# Patient Record
Sex: MALE | Race: BLACK OR AFRICAN AMERICAN | Employment: FULL TIME | URBAN - METROPOLITAN AREA
[De-identification: names, ages, dates, MRNs, and addresses within clinical notes are randomized per-mention and may not be internally consistent; named-entity substitution may affect disease eponyms.]

---

## 2018-02-14 ENCOUNTER — HOSPITAL ENCOUNTER (OUTPATIENT)
Dept: SURGERY | Age: 58
Discharge: HOME | End: 2018-02-14

## 2018-02-15 VITALS — WEIGHT: 190 LBS | BODY MASS INDEX: 25.73 KG/M2 | HEIGHT: 72 IN

## 2018-02-15 NOTE — PERIOP NOTES
Patient verified name, , and procedure. Type: 1a; abbreviated assessment per anesthesia guidelines  Labs per surgeon: none  Labs per anesthesia: none    Instructed pt that they will be notified via office/GI LAB for time of arrival to GI lab. Follow diet and prep instructions per office. NPO after midnight. Bath or shower the night before and the am of surgery with antibacterial soap. No lotions, oils, powders, cologne on skin. No make up, eye make up or jewelry. Wear loose fitting comfortable, clean clothing. Must have adult present in building the entire time . Medications for the day of procedure- none; patient to hold- none. The following discharge instructions reviewed with patient: medication given during procedure may cause drowsiness for several hours, therefore, do not drive or operate machinery for remainder of the day. You may not drink alcohol on the day of your procedure, please resume regular diet and activity unless otherwise directed. You may experience abdominal distention for several hours that is relieved by the passage of gas. Contact your physician if you have any of the following: fever or chills, severe abdominal pain or excessive amount of bleeding or a large amount when having a bowel movement.  Occasional specks of blood with bowel movement would not be unusual.

## 2018-02-19 ENCOUNTER — ANESTHESIA EVENT (OUTPATIENT)
Dept: ENDOSCOPY | Age: 58
End: 2018-02-19
Payer: COMMERCIAL

## 2018-02-19 RX ORDER — SODIUM CHLORIDE, SODIUM LACTATE, POTASSIUM CHLORIDE, CALCIUM CHLORIDE 600; 310; 30; 20 MG/100ML; MG/100ML; MG/100ML; MG/100ML
100 INJECTION, SOLUTION INTRAVENOUS CONTINUOUS
Status: CANCELLED | OUTPATIENT
Start: 2018-02-19

## 2018-02-19 RX ORDER — SODIUM CHLORIDE 0.9 % (FLUSH) 0.9 %
5-10 SYRINGE (ML) INJECTION AS NEEDED
Status: CANCELLED | OUTPATIENT
Start: 2018-02-19

## 2018-02-20 ENCOUNTER — ANESTHESIA (OUTPATIENT)
Dept: ENDOSCOPY | Age: 58
End: 2018-02-20
Payer: COMMERCIAL

## 2018-02-20 ENCOUNTER — HOSPITAL ENCOUNTER (OUTPATIENT)
Age: 58
Setting detail: OUTPATIENT SURGERY
Discharge: HOME | End: 2018-02-20
Attending: SURGERY | Admitting: SURGERY
Payer: COMMERCIAL

## 2018-02-20 VITALS
RESPIRATION RATE: 14 BRPM | OXYGEN SATURATION: 100 % | DIASTOLIC BLOOD PRESSURE: 72 MMHG | SYSTOLIC BLOOD PRESSURE: 111 MMHG | TEMPERATURE: 97.6 F | WEIGHT: 185 LBS | BODY MASS INDEX: 25.09 KG/M2 | HEART RATE: 60 BPM

## 2018-02-20 PROBLEM — Z12.11 SCREENING FOR COLON CANCER: Status: ACTIVE | Noted: 2018-02-20

## 2018-02-20 LAB — GLUCOSE BLD STRIP.AUTO-MCNC: 109 MG/DL (ref 65–100)

## 2018-02-20 PROCEDURE — 74011250636 HC RX REV CODE- 250/636

## 2018-02-20 PROCEDURE — 76040000025: Performed by: SURGERY

## 2018-02-20 PROCEDURE — 76060000032 HC ANESTHESIA 0.5 TO 1 HR: Performed by: SURGERY

## 2018-02-20 PROCEDURE — 82962 GLUCOSE BLOOD TEST: CPT

## 2018-02-20 PROCEDURE — 74011250636 HC RX REV CODE- 250/636: Performed by: ANESTHESIOLOGY

## 2018-02-20 RX ORDER — SODIUM CHLORIDE, SODIUM LACTATE, POTASSIUM CHLORIDE, CALCIUM CHLORIDE 600; 310; 30; 20 MG/100ML; MG/100ML; MG/100ML; MG/100ML
100 INJECTION, SOLUTION INTRAVENOUS CONTINUOUS
Status: DISCONTINUED | OUTPATIENT
Start: 2018-02-20 | End: 2018-02-20 | Stop reason: HOSPADM

## 2018-02-20 RX ORDER — PROPOFOL 10 MG/ML
INJECTION, EMULSION INTRAVENOUS AS NEEDED
Status: DISCONTINUED | OUTPATIENT
Start: 2018-02-20 | End: 2018-02-20 | Stop reason: HOSPADM

## 2018-02-20 RX ORDER — PROPOFOL 10 MG/ML
INJECTION, EMULSION INTRAVENOUS
Status: DISCONTINUED | OUTPATIENT
Start: 2018-02-20 | End: 2018-02-20 | Stop reason: HOSPADM

## 2018-02-20 RX ADMIN — PROPOFOL 120 MG: 10 INJECTION, EMULSION INTRAVENOUS at 09:53

## 2018-02-20 RX ADMIN — SODIUM CHLORIDE, SODIUM LACTATE, POTASSIUM CHLORIDE, AND CALCIUM CHLORIDE 100 ML/HR: 600; 310; 30; 20 INJECTION, SOLUTION INTRAVENOUS at 09:39

## 2018-02-20 RX ADMIN — PROPOFOL 200 MCG/KG/MIN: 10 INJECTION, EMULSION INTRAVENOUS at 09:53

## 2018-02-20 RX ADMIN — SODIUM CHLORIDE, SODIUM LACTATE, POTASSIUM CHLORIDE, AND CALCIUM CHLORIDE: 600; 310; 30; 20 INJECTION, SOLUTION INTRAVENOUS at 09:47

## 2018-02-20 NOTE — H&P
William Duran    2/20/2018    Date of Admission:  2/20/2018      Subjective:     Patient is a 59 y.o.  male presents for screening colonoscopy. The patient reports no problems. The patient denies family history of colon cancer. The patient has never had a colonoscopy in the past. Otherwise there is no reported rectal bleeding or melena. No changes in appetite or unusual wt loss. No abdominal pain or bloating. No reported changes in bowel habits. Patient Active Problem List    Diagnosis Date Noted    Screening for colon cancer 02/20/2018     Past Medical History:   Diagnosis Date    Blurry vision, bilateral     pt denies currently (2/15/18)    Prediabetes     pt states he is not taking metformin at this time- he is currently \"watching my diet and exercising\"      Past Surgical History:   Procedure Laterality Date    HX ORTHOPAEDIC      left knee sx 20 yrs ago    HX ORTHOPAEDIC Right 2016    finger sx      Prior to Admission Medications   Prescriptions Last Dose Informant Patient Reported? Taking? Blood-Glucose Meter monitoring kit   No No   Sig: Check blood glucose daily in am   Lancets misc   No No   Sig: Check blood glucose daily in am   glucose blood VI test strips (ASCENSIA AUTODISC VI, ONE TOUCH ULTRA TEST VI) strip   No No   Sig: Check blood sugar daily   metFORMIN (GLUCOPHAGE) 500 mg tablet   No No   Sig: Take 1 Tab by mouth two (2) times daily (with meals).    Patient not taking: Reported on 2/15/2018      Facility-Administered Medications: None     No Known Allergies   Social History   Substance Use Topics    Smoking status: Never Smoker    Smokeless tobacco: Never Used    Alcohol use No      Social History     Social History Narrative     Family History   Problem Relation Age of Onset    Diabetes Mother     Hypertension Mother     Cancer Father      prostate    Hypertension Father     Diabetes Sister     Hypertension Sister     Thyroid Disease Brother two brothers has thyroid,     Hypertension Brother     Cancer Brother     Diabetes Maternal Grandmother     No Known Problems Paternal Grandmother     No Known Problems Paternal Grandfather     Diabetes Maternal Aunt     Diabetes Maternal Uncle         Current Facility-Administered Medications   Medication Dose Route Frequency    lactated Ringers infusion  100 mL/hr IntraVENous CONTINUOUS       Review of Systems  A comprehensive review of systems was negative except for that written in the HPI. Objective:     Vitals:    02/20/18 0932   BP: (!) 143/93   Pulse: (!) 59   SpO2: 98%   Weight: 185 lb (83.9 kg)     PHYSICAL EXAM   Physical Examination: General appearance - alert, well appearing, and in no distress  Mental status - alert, oriented to person, place, and time  Eyes - pupils equal and reactive, extraocular eye movements intact  Nose - normal and patent, no erythema, discharge or polyps  Neck - supple, no significant adenopathy  Chest - clear to auscultation, no wheezes, rales or rhonchi, symmetric air entry  Heart - normal rate, regular rhythm, normal S1, S2, no murmurs, rubs, clicks or gallops  Abdomen - soft, nontender, nondistended, no masses or organomegaly  Neurological - alert, oriented, normal speech, no focal findings or movement disorder noted  Musculoskeletal - no joint tenderness, deformity or swelling  Extremities - peripheral pulses normal, no pedal edema, no clubbing or cyanosis  Skin - normal coloration and turgor, no rashes, no suspicious skin lesions noted      No results for input(s): WBC, HGB, HCT, PLT, HGBEXT, HCTEXT, PLTEXT in the last 72 hours. No results for input(s): NA, K, CL, GLU, CO2, BUN, CREA, MG, PHOS, TROIQ, INR, BNPP in the last 72 hours. No lab exists for component: TROIP, INREXT  No results for input(s): PH, PCO2, PO2, HCO3 in the last 72 hours.     Assessment:     Hospital Problems  Date Reviewed: 2/20/2018          Codes Class Noted POA    * (Principal)Screening for colon cancer ICD-10-CM: Z12.11  ICD-9-CM: V76.51  2/20/2018 Unknown            Plan:   Screening colonoscopy      Vicente Colindres, DO

## 2018-02-20 NOTE — PROCEDURES
Procedure in Detail:  Informed consent was obtained for the procedure. The patient was placed in the left lateral decubitus position and sedation was induced by anesthesia. The [id number] was inserted into the rectum and advanced under direct vision to the cecum, which was identified by the ileocecal valve and appendiceal orifice. The quality of the colonic preparation was excellent. A careful inspection was made as the colonoscope was withdrawn, including a retroflexed view of the rectum; findings and interventions are described below. Appropriate photodocumentation was obtained. Findings:   ANUS: Anal exam reveals no masses or hemorrhoids, sphincter tone is normal.   RECTUM: Rectal exam reveals no masses or hemorrhoids. SIGMOID COLON: The mucosa is normal with good vascular pattern and without ulcers, diverticula, and polyps. DESCENDING COLON: The mucosa is normal with good vascular pattern and without ulcers, diverticula, and polyps. SPLENIC FLEXURE: The splenic flexure is normal.   TRANSVERSE COLON: The mucosa is normal with good vascular pattern and without ulcers, diverticula, and polyps. HEPATIC FLEXURE: The hepatic flexure is normal.   ASCENDING COLON: The mucosa is normal with good vascular pattern and without ulcers, diverticula, and polyps. CECUM: The appendiceal orifice appears normal. The ileocecal valve appears normal.   TERMINAL ILEUM: The terminal ileum was not entered. Specimens: No specimens were collected. Complications: None; patient tolerated the procedure well. \    EBL - none    Recommendations:   - For colon cancer screening in this average-risk patient, colonoscopy may be repeated in 10 years.      Signed By: Huma Zeng DO                        February 20, 2018

## 2018-02-20 NOTE — ANESTHESIA POSTPROCEDURE EVALUATION
Post-Anesthesia Evaluation and Assessment    Patient: Marcel Mast MRN: [id number]  SSN: [id number]    YOB: 1960  Age: 59 y.o. Sex: male       Cardiovascular Function/Vital Signs  Visit Vitals    /72 (BP 1 Location: Right arm, BP Patient Position: At rest)    Pulse 60    Temp 36.4 °C (97.6 °F)    Resp 14    Wt 83.9 kg (185 lb)    SpO2 100%    BMI 25.09 kg/m2       Patient is status post total IV anesthesia anesthesia for Procedure(s):  COLONOSCOPY/ 27.    Nausea/Vomiting: None    Postoperative hydration reviewed and adequate. Pain:  Pain Scale 1: Visual (02/20/18 1037)  Pain Intensity 1: 0 (02/20/18 1037)   Managed    Neurological Status: At baseline    Mental Status and Level of Consciousness: Arousable    Pulmonary Status:   O2 Device: Room air (02/20/18 1037)   Adequate oxygenation and airway patent    Complications related to anesthesia: None    Post-anesthesia assessment completed.  No concerns    Signed By: Manisha Stevens MD     February 20, 2018

## 2018-02-20 NOTE — DISCHARGE INSTRUCTIONS
Gastrointestinal Colonoscopy/Flexible Sigmoidoscopy - Lower Exam Discharge Instructions  1. Call Selma Bernabe at office for any problems or questions. 2. Contact the doctors office for follow up appointment as directed  3. Medication may cause drowsiness for several hours, therefore, do not drive or operate machinery for remainder of the day. 4. No alcohol today. 5. Ordinarily, you may resume regular diet and activity after exam unless otherwise specified by your physician. 6. Because of air put into your colon during exam, you may experience some abdominal distension, relieved by the passage of gas, for several hours. 7. Contact your physician if you have any of the following:  a. Excessive amount of bleeding - large amount when having a bowel movement. Occasional specks of blood with bowel movement would not be unusual.  b. Severe abdominal pain  c. Fever or Chills  8. Polyp Removal - follow these additional instructions  a. Clear liquid diet for the next meal (jell-o, broth, clear drinks)  b. Soft diet for 24 hours, then resume regular diet   c. Take Metamucil - 1 tablespoon in juice every morning for 3 days  d. No Aspirin, Advil, Aleve, Nuprin, Ibuprofen, or medications that contain these drugs for 2 weeks. Any additional instructions:  ***      Instructions given to Nikko Tod and other family members.   Instructions given by:  Braulio Mantilla RN

## 2018-02-20 NOTE — IP AVS SNAPSHOT
[address] [facility], [place] 
 
 
 [address] 
[phone] Patient: Amor Escalante MRN: [id number] ILZ:[id number] About your hospitalization You were admitted on:  February 20, 2018 You last received care in the:  [facility] PREOP You were discharged on:  February 20, 2018 Why you were hospitalized Your primary diagnosis was:  Screening For Colon Cancer Follow-up Information Follow up With Details Comments Contact Info DO Kyle Abarcaervmulugeta  [address] [facility] [address] 
[phone] Your Scheduled Appointments Tuesday February 20, 2018 COLONOSCOPY with DO ORA Abarca ENDOSCOPY ([address]) [address]  
[phone] Discharge Orders None A check reynaldo indicates which time of day the medication should be taken. My Medications ASK your doctor about these medications Instructions Each Dose to Equal  
 Morning Noon Evening Bedtime Blood-Glucose Meter monitoring kit Your last dose was: Your next dose is:    
   
   
 Check blood glucose daily in am  
     
   
   
   
  
 glucose blood VI test strips strip Commonly known as:  ASCENSIA AUTODISC VI, ONE TOUCH ULTRA TEST VI Your last dose was: Your next dose is:    
   
   
 Check blood sugar daily Lancets Misc Your last dose was: Your next dose is:    
   
   
 Check blood glucose daily in am  
     
   
   
   
  
 metFORMIN 500 mg tablet Commonly known as:  GLUCOPHAGE Your last dose was: Your next dose is: Take 1 Tab by mouth two (2) times daily (with meals). 500 mg Discharge Instructions Gastrointestinal Colonoscopy/Flexible Sigmoidoscopy - Lower Exam Discharge Instructions 1. Call Sina Dennis at office for any problems or questions. 2. Contact the doctors office for follow up appointment as directed 3. Medication may cause drowsiness for several hours, therefore, do not drive or operate machinery for remainder of the day. 4. No alcohol today. 5. Ordinarily, you may resume regular diet and activity after exam unless otherwise specified by your physician. 6. Because of air put into your colon during exam, you may experience some abdominal distension, relieved by the passage of gas, for several hours. 7. Contact your physician if you have any of the following: 
a. Excessive amount of bleeding  large amount when having a bowel movement. Occasional specks of blood with bowel movement would not be unusual. 
b. Severe abdominal pain 
c. Fever or Chills 8. Polyp Removal  follow these additional instructions 
a. Clear liquid diet for the next meal (jell-o, broth, clear drinks) b. Soft diet for 24 hours, then resume regular diet  
c. Take Metamucil  1 tablespoon in juice every morning for 3 days 
d. No Aspirin, Advil, Aleve, Nuprin, Ibuprofen, or medications that contain these drugs for 2 weeks. Any additional instructions:  *** Instructions given to Amor Escalante and other family members. Instructions given by:  Oscar Padron RN Introducing [facility]! [facility] introduces [email or web address] patient portal. Now you can access parts of your medical record, email your doctor's office, and request medication refills online. 1. In your internet browser, go to [email or web address] 2. Click on the First Time User? Click Here link in the Sign In box. You will see the New Member Sign Up page. 3. Enter your [email or web address] Access Code exactly as it appears below. You will not need to use this code after youve completed the sign-up process. If you do not sign up before the expiration date, you must request a new code. · Weblio Access Code: [email or web address] Expires: 5/14/2018  5:34 AM 
 
4. Enter the last four digits of your Social Security Number (xxxx) and Date of Birth (mm/dd/yyyy) as indicated and click Submit. You will be taken to the next sign-up page. 5. Create a Weblio ID. This will be your Weblio login ID and cannot be changed, so think of one that is secure and easy to remember. 6. Create a Weblio password. You can change your password at any time. 7. Enter your Password Reset Question and Answer. This can be used at a later time if you forget your password. 8. Enter your e-mail address. You will receive e-mail notification when new information is available in [address] 9. Click Sign Up. You can now view and download portions of your medical record. 10. Click the Download Summary menu link to download a portable copy of your medical information. If you have questions, please visit the Frequently Asked Questions section of the Weblio website. Remember, Weblio is NOT to be used for urgent needs. For medical emergencies, dial 911. Now available from your iPhone and Android! Providers Seen During Your Hospitalization Provider Specialty Primary office phone Huma Zeng, Jennifer [facility] [phone] Your Primary Care Physician (PCP) Primary Care Physician Office Phone Office Fax Laneta Covert [address] You are allergic to the following No active allergies Recent Documentation Weight BMI Smoking Status 83.9 kg 25.09 kg/m2 Never Smoker Emergency Contacts Name Discharge Info Relation Home Work Mobile Cary Castaneda DISCHARGE CAREGIVER [3] Spouse [3]   [phone] Patient Belongings The following personal items are in your possession at time of discharge: 
  Dental Appliances: None Please provide this summary of care documentation to your next provider. Signatures-by signing, you are acknowledging that this After Visit Summary has been reviewed with you and you have received a copy. Patient Signature:  ____________________________________________________________ Date:  ____________________________________________________________  
  
Marlyse Leaks Provider Signature:  ____________________________________________________________ Date:  ____________________________________________________________

## 2018-02-20 NOTE — ANESTHESIA PREPROCEDURE EVALUATION
Anesthetic History   No history of anesthetic complications            Review of Systems / Medical History  Patient summary reviewed and pertinent labs reviewed    Pulmonary  Within defined limits                 Neuro/Psych   Within defined limits           Cardiovascular                  Exercise tolerance: >4 METS  Comments: Denies CP, SOB or changes in functional status   GI/Hepatic/Renal  Within defined limits              Endo/Other    Diabetes (Prediabetes)         Other Findings              Physical Exam    Airway  Mallampati: II  TM Distance: 4 - 6 cm  Neck ROM: normal range of motion   Mouth opening: Normal     Cardiovascular    Rhythm: regular  Rate: normal         Dental      Comments: Missing several teeth   Pulmonary  Breath sounds clear to auscultation               Abdominal  GI exam deferred       Other Findings            Anesthetic Plan    ASA: 2  Anesthesia type: total IV anesthesia          Induction: Intravenous  Anesthetic plan and risks discussed with: Patient

## 2019-05-08 ENCOUNTER — HOSPITAL ENCOUNTER (OUTPATIENT)
Dept: LAB | Age: 59
Discharge: HOME | End: 2019-05-08

## 2019-05-08 PROCEDURE — 88305 TISSUE EXAM BY PATHOLOGIST: CPT

## 2019-05-30 PROBLEM — R97.20 BENIGN PROSTATIC HYPERPLASIA WITH ELEVATED PROSTATE SPECIFIC ANTIGEN (PSA): Status: ACTIVE | Noted: 2019-05-30

## 2019-05-30 PROBLEM — R97.20 ELEVATED PSA: Status: ACTIVE | Noted: 2019-05-30

## 2019-05-30 PROBLEM — N40.0 BENIGN PROSTATIC HYPERPLASIA WITH ELEVATED PROSTATE SPECIFIC ANTIGEN (PSA): Status: ACTIVE | Noted: 2019-05-30

## 2019-09-24 PROBLEM — Z12.11 SCREENING FOR COLON CANCER: Status: RESOLVED | Noted: 2018-02-20 | Resolved: 2019-09-24

## 2022-03-18 PROBLEM — R97.20 ELEVATED PSA: Status: ACTIVE | Noted: 2019-05-30

## 2022-03-20 PROBLEM — N40.0 BPH WITHOUT OBSTRUCTION/LOWER URINARY TRACT SYMPTOMS: Status: ACTIVE | Noted: 2019-05-30

## 2022-04-18 ENCOUNTER — NURSE TRIAGE (OUTPATIENT)
Dept: OTHER | Facility: CLINIC | Age: 62
End: 2022-04-18

## 2022-06-15 ENCOUNTER — INITIAL CONSULT (OUTPATIENT)
Dept: CARDIOLOGY CLINIC | Age: 62
End: 2022-06-15

## 2022-06-15 VITALS
WEIGHT: 187 LBS | SYSTOLIC BLOOD PRESSURE: 136 MMHG | HEIGHT: 72 IN | BODY MASS INDEX: 25.33 KG/M2 | HEART RATE: 60 BPM | DIASTOLIC BLOOD PRESSURE: 88 MMHG

## 2022-06-15 DIAGNOSIS — R07.89 CHEST DISCOMFORT: Primary | ICD-10-CM

## 2022-06-15 PROCEDURE — 99242 OFF/OP CONSLTJ NEW/EST SF 20: CPT | Performed by: INTERNAL MEDICINE

## 2022-06-15 PROCEDURE — 93000 ELECTROCARDIOGRAM COMPLETE: CPT | Performed by: INTERNAL MEDICINE

## 2022-06-15 ASSESSMENT — ENCOUNTER SYMPTOMS
ABDOMINAL PAIN: 0
SHORTNESS OF BREATH: 0

## 2022-06-15 NOTE — PROGRESS NOTES
[address], [address] [place] [address], [address] [place] [address]  PHONE: [phone]      Tiago Josue  1960    SUBJECTIVE:   Tiago Josue is a 59 y.o. male seen for a consultation visit regarding the following:     Chief Complaint   Patient presents with    Chest Pain    Consultation            HPI:  Consultation is requested by [unfilled] for evaluation of Chest Pain and Consultation   . 57-year-old male brought to me for evaluation of some chest pain. He has never had any history of heart disease and denies diabetes. His sugar level in the past little bit high but he does not down with A1c 5.9. He describes occasional sharp sticking pain left side of his chest that comes and goes. Is only had occasionally every month. He denies any exertional type discomfort. Does not do a lot of exercise but does a lot of woodworking and cuts grass he walks there and has no symptoms. His cholesterol numbers have been very good. He denies smoking. There is no clear early family history          Past Medical History, Past Surgical History, Family history, Social History, and Medications were all reviewed with the patient today and updated as necessary.        Not on File  Past Medical History:   Diagnosis Date    Blurry vision, bilateral     pt denies currently (2/15/18)    Prediabetes     pt states he is not taking metformin at this time- he is currently \"watching my diet and exercising\"     Past Surgical History:   Procedure Laterality Date    COLONOSCOPY N/A 2/20/2018    COLONOSCOPY/ 27 performed by Scarlet Mckay DO at [address] [facility] [address]      left knee sx 20 yrs ago    ORTHOPEDIC SURGERY Right 2016    finger sx     Family History   Problem Relation Age of Onset    Hypertension Father     Cancer Father         prostate    Hypertension Sister     Thyroid Disease Brother         two brothers has thyroid,     Diabetes Sister     Hypertension Mother    Sandrine See Diabetes Mother     Diabetes Maternal Uncle     Diabetes Maternal Aunt     No Known Problems Paternal Grandfather     No Known Problems Paternal Grandmother     Diabetes Maternal Grandmother     Cancer Brother     Hypertension Brother      Social History     Tobacco Use    Smoking status: Never Smoker    Smokeless tobacco: Never Used   Substance Use Topics    Alcohol use: No       ROS:    Review of Systems   Constitutional: Negative for decreased appetite and weight loss. Cardiovascular: Positive for chest pain. Negative for palpitations and paroxysmal nocturnal dyspnea. Respiratory: Negative for shortness of breath. Hematologic/Lymphatic: Negative for bleeding problem. Gastrointestinal: Negative for abdominal pain. Neurological: Negative for dizziness and focal weakness. PHYSICAL EXAM:   /88   Pulse 60   Ht 6' (1.829 m)   Wt 187 lb (84.8 kg)   BMI 25.36 kg/m²      Physical Exam  Constitutional:       General: He is not in acute distress. Cardiovascular:      Rate and Rhythm: Normal rate and regular rhythm. Pulses: Normal pulses. Heart sounds: Normal heart sounds. No murmur heard. No gallop. Pulmonary:      Effort: Pulmonary effort is normal.      Breath sounds: No rales. Musculoskeletal:         General: No swelling. Neurological:      General: No focal deficit present. Mental Status: He is alert. Medical problems and test results were reviewed with the patient today. Results for orders placed or performed in visit on 06/15/22   EKG 12 Lead    Impression    Normal sinus rhythm rate of 60. Normal intervals. No significant ST changes.        Lab Results   Component Value Date     08/13/2021    K 3.9 08/13/2021     08/13/2021    CO2 22 08/13/2021    BUN 14 08/13/2021    GFRAA 112 08/13/2021     Lab Results   Component Value Date    CHOL 123 08/13/2021    HDL 40 08/13/2021    VLDL 27 08/13/2021     Lab Results   Component Value Date    ALT 22 08/13/2021     ASSESSMENT and PLAN    Danya Ng was seen today for chest pain and consultation. Diagnoses and all orders for this visit:    Chest discomfort patient has some very quick sharp type chest pain that do not clearly sound anginal he has no major risk factors. Just to be sure we can set him up for stress test here in the office  -     EKG 12 Lead  -     Exercise stress test; Future        [unfilled]      No follow-up provider specified. Thank you for allowing me to participate in this patient's care. Please call or contact me if there are any questions or concerns regarding the above.       Aileen Wise MD  06/15/22  3:50 PM        Consult note

## 2024-03-19 ENCOUNTER — OFFICE VISIT (OUTPATIENT)
Dept: FAMILY MEDICINE CLINIC | Facility: CLINIC | Age: 64
End: 2024-03-19
Payer: MEDICARE

## 2024-03-19 VITALS
RESPIRATION RATE: 16 BRPM | WEIGHT: 196.2 LBS | DIASTOLIC BLOOD PRESSURE: 96 MMHG | OXYGEN SATURATION: 98 % | SYSTOLIC BLOOD PRESSURE: 142 MMHG | BODY MASS INDEX: 26.57 KG/M2 | HEART RATE: 57 BPM | TEMPERATURE: 97.9 F | HEIGHT: 72 IN

## 2024-03-19 DIAGNOSIS — Z12.5 ENCOUNTER FOR SCREENING FOR MALIGNANT NEOPLASM OF PROSTATE: ICD-10-CM

## 2024-03-19 DIAGNOSIS — Z00.00 MEDICARE ANNUAL WELLNESS VISIT, SUBSEQUENT: Primary | ICD-10-CM

## 2024-03-19 DIAGNOSIS — R73.03 PRE-DIABETES: ICD-10-CM

## 2024-03-19 DIAGNOSIS — Z23 NEED FOR VACCINATION: ICD-10-CM

## 2024-03-19 DIAGNOSIS — R97.20 ELEVATED PSA: ICD-10-CM

## 2024-03-19 DIAGNOSIS — Z00.00 MEDICARE ANNUAL WELLNESS VISIT, SUBSEQUENT: ICD-10-CM

## 2024-03-19 LAB
ALBUMIN SERPL-MCNC: 3.9 G/DL (ref 3.2–4.6)
ALBUMIN/GLOB SERPL: 0.8 (ref 0.4–1.6)
ALP SERPL-CCNC: 128 U/L (ref 50–136)
ALT SERPL-CCNC: 31 U/L (ref 12–65)
ANION GAP SERPL CALC-SCNC: 1 MMOL/L (ref 2–11)
AST SERPL-CCNC: 30 U/L (ref 15–37)
BASOPHILS # BLD: 0 K/UL (ref 0–0.2)
BASOPHILS NFR BLD: 1 % (ref 0–2)
BILIRUB SERPL-MCNC: 0.5 MG/DL (ref 0.2–1.1)
BUN SERPL-MCNC: 14 MG/DL (ref 8–23)
CALCIUM SERPL-MCNC: 9.4 MG/DL (ref 8.3–10.4)
CHLORIDE SERPL-SCNC: 107 MMOL/L (ref 103–113)
CHOLEST SERPL-MCNC: 133 MG/DL
CO2 SERPL-SCNC: 30 MMOL/L (ref 21–32)
CREAT SERPL-MCNC: 1 MG/DL (ref 0.8–1.5)
DIFFERENTIAL METHOD BLD: NORMAL
EOSINOPHIL # BLD: 0.1 K/UL (ref 0–0.8)
EOSINOPHIL NFR BLD: 1 % (ref 0.5–7.8)
ERYTHROCYTE [DISTWIDTH] IN BLOOD BY AUTOMATED COUNT: 13.8 % (ref 11.9–14.6)
EST. AVERAGE GLUCOSE BLD GHB EST-MCNC: 126 MG/DL
GLOBULIN SER CALC-MCNC: 4.8 G/DL (ref 2.8–4.5)
GLUCOSE SERPL-MCNC: 111 MG/DL (ref 65–100)
HBA1C MFR BLD: 6 % (ref 4.8–5.6)
HCT VFR BLD AUTO: 48 % (ref 41.1–50.3)
HDLC SERPL-MCNC: 53 MG/DL (ref 40–60)
HDLC SERPL: 2.5
HGB BLD-MCNC: 15.7 G/DL (ref 13.6–17.2)
IMM GRANULOCYTES # BLD AUTO: 0 K/UL (ref 0–0.5)
IMM GRANULOCYTES NFR BLD AUTO: 0 % (ref 0–5)
LDLC SERPL CALC-MCNC: 66.6 MG/DL
LYMPHOCYTES # BLD: 1.8 K/UL (ref 0.5–4.6)
LYMPHOCYTES NFR BLD: 40 % (ref 13–44)
MCH RBC QN AUTO: 31 PG (ref 26.1–32.9)
MCHC RBC AUTO-ENTMCNC: 32.7 G/DL (ref 31.4–35)
MCV RBC AUTO: 94.9 FL (ref 82–102)
MONOCYTES # BLD: 0.6 K/UL (ref 0.1–1.3)
MONOCYTES NFR BLD: 12 % (ref 4–12)
NEUTS SEG # BLD: 2.1 K/UL (ref 1.7–8.2)
NEUTS SEG NFR BLD: 46 % (ref 43–78)
NRBC # BLD: 0 K/UL (ref 0–0.2)
PLATELET # BLD AUTO: 152 K/UL (ref 150–450)
PMV BLD AUTO: 9.9 FL (ref 9.4–12.3)
POTASSIUM SERPL-SCNC: 4.1 MMOL/L (ref 3.5–5.1)
PROT SERPL-MCNC: 8.7 G/DL (ref 6.3–8.2)
PSA SERPL-MCNC: 8.5 NG/ML
RBC # BLD AUTO: 5.06 M/UL (ref 4.23–5.6)
SODIUM SERPL-SCNC: 138 MMOL/L (ref 136–146)
TRIGL SERPL-MCNC: 67 MG/DL (ref 35–150)
TSH W FREE THYROID IF ABNORMAL: 1.44 UIU/ML (ref 0.36–3.74)
VLDLC SERPL CALC-MCNC: 13.4 MG/DL (ref 6–23)
WBC # BLD AUTO: 4.6 K/UL (ref 4.3–11.1)

## 2024-03-19 PROCEDURE — 3017F COLORECTAL CA SCREEN DOC REV: CPT | Performed by: NURSE PRACTITIONER

## 2024-03-19 PROCEDURE — 1123F ACP DISCUSS/DSCN MKR DOCD: CPT | Performed by: NURSE PRACTITIONER

## 2024-03-19 PROCEDURE — G8484 FLU IMMUNIZE NO ADMIN: HCPCS | Performed by: NURSE PRACTITIONER

## 2024-03-19 PROCEDURE — G0439 PPPS, SUBSEQ VISIT: HCPCS | Performed by: NURSE PRACTITIONER

## 2024-03-19 RX ORDER — ZOSTER VACCINE RECOMBINANT, ADJUVANTED 50 MCG/0.5
0.5 KIT INTRAMUSCULAR SEE ADMIN INSTRUCTIONS
Qty: 0.5 ML | Refills: 0 | Status: SHIPPED | OUTPATIENT
Start: 2024-03-19 | End: 2024-09-15

## 2024-03-19 SDOH — ECONOMIC STABILITY: HOUSING INSECURITY
IN THE LAST 12 MONTHS, WAS THERE A TIME WHEN YOU DID NOT HAVE A STEADY PLACE TO SLEEP OR SLEPT IN A SHELTER (INCLUDING NOW)?: NO

## 2024-03-19 SDOH — ECONOMIC STABILITY: FOOD INSECURITY: WITHIN THE PAST 12 MONTHS, THE FOOD YOU BOUGHT JUST DIDN'T LAST AND YOU DIDN'T HAVE MONEY TO GET MORE.: NEVER TRUE

## 2024-03-19 SDOH — ECONOMIC STABILITY: INCOME INSECURITY: HOW HARD IS IT FOR YOU TO PAY FOR THE VERY BASICS LIKE FOOD, HOUSING, MEDICAL CARE, AND HEATING?: NOT HARD AT ALL

## 2024-03-19 SDOH — ECONOMIC STABILITY: FOOD INSECURITY: WITHIN THE PAST 12 MONTHS, YOU WORRIED THAT YOUR FOOD WOULD RUN OUT BEFORE YOU GOT MONEY TO BUY MORE.: NEVER TRUE

## 2024-03-19 ASSESSMENT — PATIENT HEALTH QUESTIONNAIRE - PHQ9
1. LITTLE INTEREST OR PLEASURE IN DOING THINGS: NOT AT ALL
SUM OF ALL RESPONSES TO PHQ QUESTIONS 1-9: 0
2. FEELING DOWN, DEPRESSED OR HOPELESS: NOT AT ALL
SUM OF ALL RESPONSES TO PHQ9 QUESTIONS 1 & 2: 0
SUM OF ALL RESPONSES TO PHQ QUESTIONS 1-9: 0

## 2024-03-19 NOTE — PATIENT INSTRUCTIONS
have a serious illness that gets worse over time or can't be cured?  What are you most afraid of that might happen? (Maybe you're afraid of having pain, losing your independence, or being kept alive by machines.)  Where would you prefer to die? (Your home? A hospital? A nursing home?)  Do you want to donate your organs when you die?  Do you want certain Confucianist practices performed before you die?  When should you call for help?  Be sure to contact your doctor if you have any questions.  Where can you learn more?  Go to [email or web address] and enter R264 to learn more about \"Advance Directives: Care Instructions.\"  Current as of: November 16, 2023               Content Version: 14.0  © 1916-0889 [employer].   Care instructions adapted under license by [employer]. If you have questions about a medical condition or this instruction, always ask your healthcare professional. [employer] disclaims any warranty or liability for your use of this information.      Personalized Preventive Plan for Gabriel Wayne - 3/19/2024  Medicare offers a range of preventive health benefits. Some of the tests and screenings are paid in full while other may be subject to a deductible, co-insurance, and/or copay.    Some of these benefits include a comprehensive review of your medical history including lifestyle, illnesses that may run in your family, and various assessments and screenings as appropriate.    After reviewing your medical record and screening and assessments performed today your provider may have ordered immunizations, labs, imaging, and/or referrals for you.  A list of these orders (if applicable) as well as your Preventive Care list are included within your After Visit Summary for your review.    Other Preventive Recommendations:    A preventive eye exam performed by an eye specialist is recommended every 1-2 years to screen for glaucoma; cataracts, macular degeneration, and

## 2024-03-19 NOTE — PROGRESS NOTES
Medicare Annual Wellness Visit    Gabriel Wayne is here for Medicare AWV    Assessment & Plan   Medicare annual wellness visit, subsequent  -     CBC with Auto Differential; Future  -     Comprehensive Metabolic Panel; Future  -     Lipid Panel; Future  -     TSH with Reflex; Future  -     EKG 12 Lead  Need for vaccination  -     zoster recombinant adjuvanted vaccine (SHINGRIX) 50 MCG/0.5ML SUSR injection; Inject 0.5 mLs into the muscle See Admin Instructions 1 dose now and repeat in 2-6 months, Disp-0.5 mL, R-0Normal  -     Tdap (ADACEL) 5-2-15.5 LF-MCG/0.5 injection; Inject 0.5 mLs into the muscle once for 1 dose, Disp-0.5 mL, R-0Normal  Elevated PSA  -     PSA Screening; Future  Pre-diabetes  -     Hemoglobin A1C; Future  Encounter for screening for malignant neoplasm of prostate  -     PSA Screening; Future    Recommendations for Preventive Services Due: see orders and patient instructions/AVS.  Recommended screening schedule for the next 5-10 years is provided to the patient in written form: see Patient Instructions/AVS.     Return for Medicare Annual Wellness Visit in 1 year.     Subjective   For AMW. Blood work checked last 2021 showing A1c 5.9, elevated psa, elevated triglycerides. He has famiy hx of thyroid disease- unsure of which type. He checks his bp at home- can range from 150//70. Has not seen his urologist in awhile.    AMW- refusing vaccines except shingrix and adacel- script sent to pharmacy. No living will or POA- refusing ACP.     Patient's complete Health Risk Assessment and screening values have been reviewed and are found in Flowsheets. The following problems were reviewed today and where indicated follow up appointments were made and/or referrals ordered.    Positive Risk Factor Screenings with Interventions:                 Dentist Screen:  Have you seen the dentist within the past year?: (!) No    Intervention:  See AVS for additional education material     Vision Screen:  Do you

## 2025-03-24 ENCOUNTER — OFFICE VISIT (OUTPATIENT)
Dept: FAMILY MEDICINE CLINIC | Facility: CLINIC | Age: 65
End: 2025-03-24
Payer: MEDICARE

## 2025-03-24 VITALS
HEART RATE: 58 BPM | TEMPERATURE: 97.5 F | OXYGEN SATURATION: 98 % | HEIGHT: 72 IN | DIASTOLIC BLOOD PRESSURE: 100 MMHG | WEIGHT: 200 LBS | SYSTOLIC BLOOD PRESSURE: 140 MMHG | BODY MASS INDEX: 27.09 KG/M2 | RESPIRATION RATE: 18 BRPM

## 2025-03-24 DIAGNOSIS — Z00.00 MEDICARE ANNUAL WELLNESS VISIT, SUBSEQUENT: Primary | ICD-10-CM

## 2025-03-24 DIAGNOSIS — R97.20 ELEVATED PSA: ICD-10-CM

## 2025-03-24 DIAGNOSIS — L30.9 DERMATITIS: ICD-10-CM

## 2025-03-24 DIAGNOSIS — Z71.89 ACP (ADVANCE CARE PLANNING): ICD-10-CM

## 2025-03-24 DIAGNOSIS — R73.03 PRE-DIABETES: ICD-10-CM

## 2025-03-24 DIAGNOSIS — Z00.00 MEDICARE ANNUAL WELLNESS VISIT, SUBSEQUENT: ICD-10-CM

## 2025-03-24 LAB
ALBUMIN SERPL-MCNC: 3.7 G/DL (ref 3.2–4.6)
ALBUMIN/GLOB SERPL: 0.9 (ref 1–1.9)
ALP SERPL-CCNC: 103 U/L (ref 40–129)
ALT SERPL-CCNC: 23 U/L (ref 8–55)
ANION GAP SERPL CALC-SCNC: 9 MMOL/L (ref 7–16)
AST SERPL-CCNC: 30 U/L (ref 15–37)
BASOPHILS # BLD: 0.04 K/UL (ref 0–0.2)
BASOPHILS NFR BLD: 1.2 % (ref 0–2)
BILIRUB SERPL-MCNC: 0.3 MG/DL (ref 0–1.2)
BUN SERPL-MCNC: 12 MG/DL (ref 8–23)
CALCIUM SERPL-MCNC: 9.3 MG/DL (ref 8.8–10.2)
CHLORIDE SERPL-SCNC: 107 MMOL/L (ref 98–107)
CHOLEST SERPL-MCNC: 138 MG/DL (ref 0–200)
CO2 SERPL-SCNC: 26 MMOL/L (ref 20–29)
CREAT SERPL-MCNC: 0.82 MG/DL (ref 0.8–1.3)
DIFFERENTIAL METHOD BLD: ABNORMAL
EOSINOPHIL # BLD: 0.05 K/UL (ref 0–0.8)
EOSINOPHIL NFR BLD: 1.5 % (ref 0.5–7.8)
ERYTHROCYTE [DISTWIDTH] IN BLOOD BY AUTOMATED COUNT: 13.2 % (ref 11.9–14.6)
EST. AVERAGE GLUCOSE BLD GHB EST-MCNC: 133 MG/DL
GLOBULIN SER CALC-MCNC: 4.1 G/DL (ref 2.3–3.5)
GLUCOSE SERPL-MCNC: 101 MG/DL (ref 70–99)
HBA1C MFR BLD: 6.3 % (ref 0–5.6)
HCT VFR BLD AUTO: 46.2 % (ref 41.1–50.3)
HDLC SERPL-MCNC: 46 MG/DL (ref 40–60)
HDLC SERPL: 3 (ref 0–5)
HGB BLD-MCNC: 15.3 G/DL (ref 13.6–17.2)
IMM GRANULOCYTES # BLD AUTO: 0.01 K/UL (ref 0–0.5)
IMM GRANULOCYTES NFR BLD AUTO: 0.3 % (ref 0–5)
LDLC SERPL CALC-MCNC: 75 MG/DL (ref 0–100)
LYMPHOCYTES # BLD: 1.48 K/UL (ref 0.5–4.6)
LYMPHOCYTES NFR BLD: 43.3 % (ref 13–44)
MCH RBC QN AUTO: 31 PG (ref 26.1–32.9)
MCHC RBC AUTO-ENTMCNC: 33.1 G/DL (ref 31.4–35)
MCV RBC AUTO: 93.7 FL (ref 82–102)
MONOCYTES # BLD: 0.52 K/UL (ref 0.1–1.3)
MONOCYTES NFR BLD: 15.2 % (ref 4–12)
NEUTS SEG # BLD: 1.32 K/UL (ref 1.7–8.2)
NEUTS SEG NFR BLD: 38.5 % (ref 43–78)
NRBC # BLD: 0 K/UL (ref 0–0.2)
PLATELET # BLD AUTO: 156 K/UL (ref 150–450)
PMV BLD AUTO: 10.3 FL (ref 9.4–12.3)
POTASSIUM SERPL-SCNC: 4.1 MMOL/L (ref 3.5–5.1)
PROT SERPL-MCNC: 7.8 G/DL (ref 6.3–8.2)
PSA SERPL-MCNC: 5.6 NG/ML (ref 0–4)
RBC # BLD AUTO: 4.93 M/UL (ref 4.23–5.6)
SODIUM SERPL-SCNC: 141 MMOL/L (ref 136–145)
TRIGL SERPL-MCNC: 90 MG/DL (ref 0–150)
VLDLC SERPL CALC-MCNC: 18 MG/DL (ref 6–23)
WBC # BLD AUTO: 3.4 K/UL (ref 4.3–11.1)

## 2025-03-24 PROCEDURE — G2211 COMPLEX E/M VISIT ADD ON: HCPCS | Performed by: NURSE PRACTITIONER

## 2025-03-24 PROCEDURE — 99497 ADVNCD CARE PLAN 30 MIN: CPT | Performed by: NURSE PRACTITIONER

## 2025-03-24 PROCEDURE — 99214 OFFICE O/P EST MOD 30 MIN: CPT | Performed by: NURSE PRACTITIONER

## 2025-03-24 PROCEDURE — 1159F MED LIST DOCD IN RCRD: CPT | Performed by: NURSE PRACTITIONER

## 2025-03-24 PROCEDURE — 1036F TOBACCO NON-USER: CPT | Performed by: NURSE PRACTITIONER

## 2025-03-24 PROCEDURE — 1160F RVW MEDS BY RX/DR IN RCRD: CPT | Performed by: NURSE PRACTITIONER

## 2025-03-24 PROCEDURE — G8427 DOCREV CUR MEDS BY ELIG CLIN: HCPCS | Performed by: NURSE PRACTITIONER

## 2025-03-24 PROCEDURE — 1123F ACP DISCUSS/DSCN MKR DOCD: CPT | Performed by: NURSE PRACTITIONER

## 2025-03-24 PROCEDURE — G0439 PPPS, SUBSEQ VISIT: HCPCS | Performed by: NURSE PRACTITIONER

## 2025-03-24 PROCEDURE — 3017F COLORECTAL CA SCREEN DOC REV: CPT | Performed by: NURSE PRACTITIONER

## 2025-03-24 PROCEDURE — G8419 CALC BMI OUT NRM PARAM NOF/U: HCPCS | Performed by: NURSE PRACTITIONER

## 2025-03-24 RX ORDER — NYSTATIN 100000 [USP'U]/G
POWDER TOPICAL 2 TIMES DAILY
Qty: 60 G | Refills: 1 | Status: SHIPPED | OUTPATIENT
Start: 2025-03-24

## 2025-03-24 RX ORDER — NYSTATIN 100000 U/G
CREAM TOPICAL
Qty: 30 G | Refills: 1 | Status: SHIPPED | OUTPATIENT
Start: 2025-03-24

## 2025-03-24 SDOH — ECONOMIC STABILITY: FOOD INSECURITY: WITHIN THE PAST 12 MONTHS, YOU WORRIED THAT YOUR FOOD WOULD RUN OUT BEFORE YOU GOT MONEY TO BUY MORE.: NEVER TRUE

## 2025-03-24 SDOH — ECONOMIC STABILITY: FOOD INSECURITY: WITHIN THE PAST 12 MONTHS, THE FOOD YOU BOUGHT JUST DIDN'T LAST AND YOU DIDN'T HAVE MONEY TO GET MORE.: NEVER TRUE

## 2025-03-24 ASSESSMENT — PATIENT HEALTH QUESTIONNAIRE - PHQ9
SUM OF ALL RESPONSES TO PHQ QUESTIONS 1-9: 0
1. LITTLE INTEREST OR PLEASURE IN DOING THINGS: NOT AT ALL
SUM OF ALL RESPONSES TO PHQ QUESTIONS 1-9: 0
2. FEELING DOWN, DEPRESSED OR HOPELESS: NOT AT ALL

## 2025-03-24 ASSESSMENT — LIFESTYLE VARIABLES
HOW OFTEN DO YOU HAVE A DRINK CONTAINING ALCOHOL: NEVER
HOW MANY STANDARD DRINKS CONTAINING ALCOHOL DO YOU HAVE ON A TYPICAL DAY: PATIENT DOES NOT DRINK

## 2025-03-24 NOTE — PROGRESS NOTES
Medicare Annual Wellness Visit    Gabriel Wayne is here for Medicare AWV and Rash (Between toes )    Assessment & Plan   Medicare annual wellness visit, subsequent  -     CBC with Auto Differential; Future  -     Comprehensive Metabolic Panel; Future  -     Lipid Panel; Future  Elevated PSA  -     [facility]  -     PSA, Diagnostic; Future  Pre-diabetes  -     CBC with Auto Differential; Future  -     Comprehensive Metabolic Panel; Future  -     Hemoglobin A1C; Future  Dermatitis  -     nystatin (MYCOSTATIN) 057934 UNIT/GM cream; Apply topically 2 times daily., Disp-30 g, R-1, Normal  -     nystatin (MYCOSTATIN) 179683 UNIT/GM powder; Apply topically 2 times daily, Topical, 2 TIMES DAILY Starting Mon 3/24/2025, Disp-60 g, R-1, Normal  ACP (advance care planning)  -     [facility] - Referral to ACP Clinical Specialist  AMW- see sheet scanned into chart- refusing vaccines. No living will or POA- ordered ACP.     Psa- urology referral placed- needs to see them.    Pre diabetes- Blood sugar control is important to prevent long-term complications that can result from poorly controlled blood sugar (including problems affecting the eyes, kidney, nervous system, and cardiovascular system). Encouraged to work on diet and exercise. Limit consumption of sugary beverages such as soft drinks or juice (even natural juice) or stop drinking them completely. Encouraging limiting intake of chips, white bread, white pasta, white rice.       Blood pressure- Patient instructed to blood pressure should be below 140/90. Ways to do this include lose weight, choose a diet low in fat and rich in fruits, vegetables, and low-fat dairy products. Patient also instructed to reduce the amount of salt in diet, participate in 30 minutes of activity a day, cut down on alcohol. Patient instructed to get a home blood pressure meter. People who check their own blood pressure at home do better at keeping it low and can

## 2025-03-25 ENCOUNTER — RESULTS FOLLOW-UP (OUTPATIENT)
Dept: FAMILY MEDICINE CLINIC | Facility: CLINIC | Age: 65
End: 2025-03-25

## 2025-03-25 ENCOUNTER — CLINICAL DOCUMENTATION (OUTPATIENT)
Dept: SPIRITUAL SERVICES | Age: 65
End: 2025-03-25

## 2025-03-25 NOTE — PROGRESS NOTES
Advance Care Planning   Ambulatory ACP Specialist Patient Outreach    Date:  3/25/2025    ACP Specialist:  Shayla Corey    Outreach call to patient in follow-up to ACP Specialist referral from:Hyun Miles APRN - CNP    [x] PCP  [] Provider   [] Ambulatory Care Management [] Other     For:                  [x] Advance Directive Assistance              [] Complete Portable DNR order              [] Complete POST/POLST/MOST              [] Code Status Discussion             [] Discuss Goals of Care             [] Early ACP Decision-Making              [] Other (Specify)    Date Referral Received:3/24/25    Next Step:   [] ACP scheduled conversation  [x] Outreach again in one week               [] Email / Mail ACP Info Sheets  [] Email / Mail Advance Directive   [] Closing referral.  Routing closure to referring provider/staff and to ACP Specialist .    [] Closure letter mailed to patient with invitation to contact ACP Specialist if / when ready.   [] Other (Specify here):       [x] At this time, Healthcare Decision Maker Is:   Primary Decision Maker: Kimberley Wayne P - Spouse - [phone]         [] Primary agent named in scanned advance directive.    [x] Legal Next of Kin.     [] Unable to determine legal decision maker at this time.    Outreaches:         [x] 1st -  Date:  3/25/25               Intervention:  [x] Spoke with Patient   [] Left Voice mail [] Email / Mail    [] Ridejoyhart  [] Other (Specify) :     Outcomes:  Outreach phone call to the patient on mobile phone number which is also listed as the home number.  Spoke with patient asking if we could call him back on tomorrow after consulting with his wife.  Will attempt to follow up on tomorrow 3/26/25.    Addendum:  Date:  3/26/25  Outreach phone call to the patient on mobile phone number which is also listed as the home number.  Unable to reach patient.  Provided contact information on [facility] [employer] requesting a return call.  Will

## 2025-03-26 ENCOUNTER — CLINICAL DOCUMENTATION (OUTPATIENT)
Dept: SPIRITUAL SERVICES | Age: 65
End: 2025-03-26

## 2025-03-27 ENCOUNTER — OFFICE VISIT (OUTPATIENT)
Dept: ONCOLOGY | Age: 65
End: 2025-03-27
Payer: MEDICARE

## 2025-03-27 VITALS
WEIGHT: 197 LBS | SYSTOLIC BLOOD PRESSURE: 140 MMHG | HEART RATE: 70 BPM | HEIGHT: 72 IN | RESPIRATION RATE: 18 BRPM | OXYGEN SATURATION: 96 % | DIASTOLIC BLOOD PRESSURE: 90 MMHG | TEMPERATURE: 97.7 F | BODY MASS INDEX: 26.68 KG/M2

## 2025-03-27 DIAGNOSIS — C67.8 MALIGNANT NEOPLASM OF OVERLAPPING SITES OF BLADDER (HCC): Primary | ICD-10-CM

## 2025-03-27 PROCEDURE — 1036F TOBACCO NON-USER: CPT | Performed by: PHYSICIAN ASSISTANT

## 2025-03-27 PROCEDURE — G8427 DOCREV CUR MEDS BY ELIG CLIN: HCPCS | Performed by: PHYSICIAN ASSISTANT

## 2025-03-27 PROCEDURE — 1159F MED LIST DOCD IN RCRD: CPT | Performed by: PHYSICIAN ASSISTANT

## 2025-03-27 PROCEDURE — 1160F RVW MEDS BY RX/DR IN RCRD: CPT | Performed by: PHYSICIAN ASSISTANT

## 2025-03-27 PROCEDURE — 1126F AMNT PAIN NOTED NONE PRSNT: CPT | Performed by: PHYSICIAN ASSISTANT

## 2025-03-27 PROCEDURE — 3017F COLORECTAL CA SCREEN DOC REV: CPT | Performed by: PHYSICIAN ASSISTANT

## 2025-03-27 PROCEDURE — 99203 OFFICE O/P NEW LOW 30 MIN: CPT | Performed by: PHYSICIAN ASSISTANT

## 2025-03-27 PROCEDURE — 1123F ACP DISCUSS/DSCN MKR DOCD: CPT | Performed by: PHYSICIAN ASSISTANT

## 2025-03-27 PROCEDURE — G8419 CALC BMI OUT NRM PARAM NOF/U: HCPCS | Performed by: PHYSICIAN ASSISTANT

## 2025-03-27 NOTE — PROGRESS NOTES
Urologic Oncology  [facility]  [address] [place] [id number]  [phone]          Gabriel Wayne  : 1960      INITIAL EVALUATION    Chief Complaint   Patient presents with    New Patient       HPI:     Gabriel Wayne is a 64 y.o. male who was referred for evaluation of elevated PSA.  Patient has a long history of elevated PSA with a prior prostate biopsy by Dr. La on 2019 that was negative for malignancy.  Patient states that his PSA around that time was 8.6.  Most recently his PSA on 3/24/2025 was 5.6. He has never had imaging of his prostate.    Patient states that he previously had difficulty initiating urinary stream first thing in the morning but is since started saw palmetto with excellent relief of his symptoms.  He now denies any significant lower urinary tract symptoms.  No frequency, urgency, dysuria or hematuria.    Patient endorses family history of prostate cancer with a dad who passed away from metastatic prostate cancer and 1 brother treated with radical prostatectomy.  He has 2 other brothers both with enlarged prostates but no history of prostate cancer to date.    Patient is otherwise healthy 64-year-old male without significant medical comorbidities.  He denies blood thinners or anticoagulation.      PMH:     Past Medical History:   Diagnosis Date    Blurry vision, bilateral     pt denies currently (2/15/18)    Prediabetes     pt states he is not taking metformin at this time- he is currently \"watching my diet and exercising\"       PSH:    Past Surgical History:   Procedure Laterality Date    COLONOSCOPY N/A 2018    COLONOSCOPY/ 27 performed by Noah Smith DO at [facility]    ORTHOPEDIC SURGERY      left knee sx 20 yrs ago    ORTHOPEDIC SURGERY Right 2016    finger sx       MEDs:    Current Outpatient Medications   Medication Sig Dispense Refill    nystatin (MYCOSTATIN) 296941 UNIT/GM cream Apply topically 2 times daily. 30 g 1

## 2025-03-27 NOTE — PATIENT INSTRUCTIONS
Patient Information from Today's Visit    The members of your Oncology Medical Home are listed below:    Physician Provider: Ralph Montes, Urologic Oncologist  Advanced Practice Clinician: RAYNA Uriarte  Nurse Navigator: Tawny SANTIAGO RN  Medical Assistant: Alice GRANADOS MA  :Berenice MACIAS  Supportive Care Services: Kelli CURRY LMSW    Diagnosis:  Elevated PSA    Follow Up Instructions:    New Patient visit today    We reviewed your PSA results  Your Prostate - if feels large but I don't feel anything concerning  We will recommend a MRI in the next 2-4 weeks and follow up with us after to go over results.   We talked about Biopsy - we will wait and talk about having a biopsy until you have your MRI  *MRI prior to office visit - you can call the following number to schedule this if you do not hear from them within 2-3 weeks of your appointment.    You can call to schedule this at [phone].   Return to the office in 3-4 weeks after your MRI to go over your biopsy.      Current Labs:    Results for orders placed or performed in visit on 03/24/25   Lipid Panel   Result Value Ref Range    Cholesterol, Total 138 0 - 200 MG/DL    Triglycerides 90 0 - 150 MG/DL    HDL 46 40 - 60 MG/DL    LDL Cholesterol 75 0 - 100 MG/DL    VLDL Cholesterol Calculated 18 6 - 23 MG/DL    Chol/HDL Ratio 3.0 0.0 - 5.0     Hemoglobin A1C   Result Value Ref Range    Hemoglobin A1C 6.3 (H) 0 - 5.6 %    Estimated Avg Glucose 133 mg/dL   Comprehensive Metabolic Panel   Result Value Ref Range    Sodium 141 136 - 145 mmol/L    Potassium 4.1 3.5 - 5.1 mmol/L    Chloride 107 98 - 107 mmol/L    CO2 26 20 - 29 mmol/L    Anion Gap 9 7 - 16 mmol/L    Glucose 101 (H) 70 - 99 mg/dL    BUN 12 8 - 23 MG/DL    Creatinine 0.82 0.80 - 1.30 MG/DL    Est, Glom Filt Rate >90 >60 ml/min/1.73m2    Calcium 9.3 8.8 - 10.2 MG/DL    Total Bilirubin 0.3 0.0 - 1.2 MG/DL    ALT 23 8 - 55 U/L    AST 30 15 - 37 U/L    Alk Phosphatase 103 40 - 129 U/L    Total Protein

## 2025-04-02 ENCOUNTER — CLINICAL DOCUMENTATION (OUTPATIENT)
Dept: SPIRITUAL SERVICES | Age: 65
End: 2025-04-02

## 2025-04-14 ENCOUNTER — CLINICAL DOCUMENTATION (OUTPATIENT)
Dept: SPIRITUAL SERVICES | Age: 65
End: 2025-04-14

## 2025-04-15 NOTE — PROGRESS NOTES
clear,normocephalic, atraumatic. no external lesions   Cardiovascular: Reg. Normal perfusion  Respiratory: normal respiratory effort, no JVD, no audible wheezing.  Musculoskeletal: unremarkable with normal function. No embolic signs or cyanosis.   Neurologic exam: intact, no focal deficits, moves all 4 extremities  Psych: normal mood and affect, alert, oriented x 3  LE:  no edema  GI: soft, nontender, no masses, no CVA tenderness  : DEFERRED       LABORATORY RESULTS:    Lab Results   Component Value Date/Time    PSA 5.6 03/24/2025 09:00 AM    PSA 8.5 03/19/2024 08:34 AM          IMAGING:      MRI Results:    === 04/10/25 ===    MRI PROSTATE W WO CONTRAST    - Narrative -  EXAM: MRI PELVIS WITH AND WITHOUT CONTRAST - PROSTATE    CLINICAL INDICATION/HISTORY: Elevated PSA. Previous benign biopsy.    COMPARISON: None.    TECHNIQUE: MRI pelvis with and without IV contrast performed. Prostate protocol;  including T2, T1, diffusion, and dynamic enhanced imaging.    FINDINGS:  Prostate:  Size: 5.7 cm craniocaudally, 5.8 cm transverse, 4.2 cm AP.    Peripheral zone: Limitations on DWI and ADC maps due to artifact from adjacent  rectal gas. No definite restricted diffusion in the peripheral zone. A few  irregular T2 hypointense scars of the peripheral zone.    Transition zone: Multiple well encapsulated T2 hyperintense nodules are seen  throughout the bilateral transitional zone, consistent with sequela of benign  prostatic hyperplasia.    Seminal vesicles: Unremarkable.    Pelvic lymph nodes: No adenopathy.    Significant additional findings: Small fat-containing renal hernias bilaterally.  No marrow lesions identified.    - Impression -  No MRI evidence of clinically significant prostate cancer: BPH. PI-RADS 2.  No adenopathy or worrisome osseous lesions.      Electronically signed by Lv Martinez      ASSESSMENT:    Mr. Gabriel Wayne is a 64 y.o. male with a diagnosis of elevated PSA.    Assessment & Plan  1. Elevated

## 2025-04-16 ENCOUNTER — TELEMEDICINE (OUTPATIENT)
Dept: FAMILY MEDICINE CLINIC | Facility: CLINIC | Age: 65
End: 2025-04-16

## 2025-04-16 DIAGNOSIS — B96.89 ACUTE BACTERIAL SINUSITIS: Primary | ICD-10-CM

## 2025-04-16 DIAGNOSIS — J01.90 ACUTE BACTERIAL SINUSITIS: Primary | ICD-10-CM

## 2025-04-16 DIAGNOSIS — R09.81 NASAL CONGESTION: ICD-10-CM

## 2025-04-16 RX ORDER — AZITHROMYCIN 250 MG/1
TABLET, FILM COATED ORAL
Qty: 6 TABLET | Refills: 0 | Status: SHIPPED | OUTPATIENT
Start: 2025-04-16

## 2025-04-16 RX ORDER — FLUTICASONE PROPIONATE 50 MCG
2 SPRAY, SUSPENSION (ML) NASAL DAILY
Qty: 48 G | Refills: 1 | Status: SHIPPED | OUTPATIENT
Start: 2025-04-16

## 2025-04-16 ASSESSMENT — ENCOUNTER SYMPTOMS
SORE THROAT: 0
SINUS COMPLAINT: 1
SINUS PRESSURE: 1
SINUS PAIN: 1
RESPIRATORY NEGATIVE: 1

## 2025-04-16 NOTE — PROGRESS NOTES
Gabriel Wayne, was evaluated through a synchronous (real-time) audio-video encounter. The patient (or guardian if applicable) is aware that this is a billable service, which includes applicable co-pays. This Virtual Visit was conducted with patient's (and/or legal guardian's) consent. Patient identification was verified, and a caregiver was present when appropriate.   The patient was located at Home: [address] [place] [address]  Provider was located at Facility (Appt Dept): [address] [place] [id number]  Confirm you are appropriately licensed, registered, or certified to deliver care in the state where the patient is located as indicated above. If you are not or unsure, please re-schedule the visit: Yes, I confirm.     Gabriel Wayne (:  1960) is a Established patient, presenting virtually for evaluation of the following:      Below is the assessment and plan developed based on review of pertinent history, physical exam, labs, studies, and medications.     Assessment & Plan  Acute bacterial sinusitis   Acute condition, new, Supportive care with appropriate antipyretics and fluids.    Orders:    azithromycin (ZITHROMAX) 250 MG tablet; Take 2 Tablets with food by mouth first day, then 1 tab with food by mouth daily for days 2 through 5.    Nasal congestion   New, not at goal (unstable), changes made today: start flonase, medication adherence emphasized, and lifestyle modifications recommended    Orders:    fluticasone (FLONASE) 50 MCG/ACT nasal spray; 2 sprays by Each Nostril route daily      Come in office if symptoms do not improve.       Subjective   Sinus Problem  Associated symptoms include congestion and sinus pressure. Pertinent negatives include no ear pain or sore throat.     Through VV for sinus problems. Thinks he has sinus infection. Has tried nyquil- did not help. Denies fever, headache. Symptoms started a week ago- having symptoms of stuffy nose,

## 2025-04-18 ENCOUNTER — OFFICE VISIT (OUTPATIENT)
Dept: ONCOLOGY | Age: 65
End: 2025-04-18
Payer: MEDICARE

## 2025-04-18 VITALS
RESPIRATION RATE: 16 BRPM | SYSTOLIC BLOOD PRESSURE: 130 MMHG | BODY MASS INDEX: 26.59 KG/M2 | WEIGHT: 196.3 LBS | TEMPERATURE: 97.5 F | OXYGEN SATURATION: 96 % | DIASTOLIC BLOOD PRESSURE: 94 MMHG | HEART RATE: 58 BPM | HEIGHT: 72 IN

## 2025-04-18 DIAGNOSIS — R97.20 ELEVATED PSA: Primary | ICD-10-CM

## 2025-04-18 PROCEDURE — 1036F TOBACCO NON-USER: CPT | Performed by: UROLOGY

## 2025-04-18 PROCEDURE — 1159F MED LIST DOCD IN RCRD: CPT | Performed by: UROLOGY

## 2025-04-18 PROCEDURE — 3017F COLORECTAL CA SCREEN DOC REV: CPT | Performed by: UROLOGY

## 2025-04-18 PROCEDURE — 99213 OFFICE O/P EST LOW 20 MIN: CPT | Performed by: UROLOGY

## 2025-04-18 PROCEDURE — 1160F RVW MEDS BY RX/DR IN RCRD: CPT | Performed by: UROLOGY

## 2025-04-18 PROCEDURE — 1126F AMNT PAIN NOTED NONE PRSNT: CPT | Performed by: UROLOGY

## 2025-04-18 PROCEDURE — G8419 CALC BMI OUT NRM PARAM NOF/U: HCPCS | Performed by: UROLOGY

## 2025-04-18 PROCEDURE — 1123F ACP DISCUSS/DSCN MKR DOCD: CPT | Performed by: UROLOGY

## 2025-04-18 PROCEDURE — G8427 DOCREV CUR MEDS BY ELIG CLIN: HCPCS | Performed by: UROLOGY

## 2025-04-18 ASSESSMENT — ENCOUNTER SYMPTOMS
GASTROINTESTINAL NEGATIVE: 1
RESPIRATORY NEGATIVE: 1

## 2025-04-18 ASSESSMENT — PATIENT HEALTH QUESTIONNAIRE - PHQ9
1. LITTLE INTEREST OR PLEASURE IN DOING THINGS: NOT AT ALL
SUM OF ALL RESPONSES TO PHQ QUESTIONS 1-9: 0
SUM OF ALL RESPONSES TO PHQ QUESTIONS 1-9: 0
2. FEELING DOWN, DEPRESSED OR HOPELESS: NOT AT ALL
SUM OF ALL RESPONSES TO PHQ QUESTIONS 1-9: 0
SUM OF ALL RESPONSES TO PHQ QUESTIONS 1-9: 0

## 2025-04-18 NOTE — PATIENT INSTRUCTIONS
Patient Information from Today's Visit    The members of your Oncology Medical Home are listed below:    Physician Provider: Ralph Montes, Urologic Oncologist  Advanced Practice Clinician: RAYNA Uriarte  Medical Assistant: Renee SMITH CMA  :Berenice MACIAS  Supportive Care Services: Kelli CURRY LMSW    Diagnosis (Information Sheet Provided on Day of Diagnosis): Elevated PSA    Follow Up Instructions:   MRI reviewed   We do not feel the need to do a prostate biopsy on you at this time, we would recommend active surveillance.  We will plan to see you back in 1 year, If you need anything prior please do not hesitate to call our office.  Has Treatment Plan Been Finalized? No    Current Labs:   No visits with results within 3 Day(s) from this visit.   Latest known visit with results is:   Orders Only on 03/24/2025   Component Date Value Ref Range Status    Cholesterol, Total 03/24/2025 138  0 - 200 MG/DL Final    Comment: Low: Less than or equal to 200 mg/dL  Borderline High: 201-239 mg/dL  High: Greater than or equal to 240 mg/dL      Triglycerides 03/24/2025 90  0 - 150 MG/DL Final    Comment: Borderline High: 150-199 mg/dL, High: 200-499 mg/dL  Very High: Greater than or equal to 500 mg/dL      HDL 03/24/2025 46  40 - 60 MG/DL Final    LDL Cholesterol 03/24/2025 75  0 - 100 MG/DL Final    Comment: Near Optimal: 100-129 mg/dL  Borderline High: 130-159, High: 160-189 mg/dL  Very High: Greater than or equal to 190 mg/dL      VLDL Cholesterol Calculated 03/24/2025 18  6 - 23 MG/DL Final    Chol/HDL Ratio 03/24/2025 3.0  0.0 - 5.0   Final    Hemoglobin A1C 03/24/2025 6.3 (H)  0 - 5.6 % Final    Comment: Reference Range  Normal       <5.7%  Prediabetes  5.7-6.4%  Diabetes     >6.4%      Estimated Avg Glucose 03/24/2025 133  mg/dL Final    Sodium 03/24/2025 141  136 - 145 mmol/L Final    Potassium 03/24/2025 4.1  3.5 - 5.1 mmol/L Final    Chloride 03/24/2025 107  98 - 107 mmol/L Final    CO2 03/24/2025 26  20 -

## 2025-05-17 ENCOUNTER — APPOINTMENT (OUTPATIENT)
Dept: GENERAL RADIOLOGY | Age: 65
End: 2025-05-17
Payer: MEDICARE

## 2025-05-17 ENCOUNTER — CLINICAL DOCUMENTATION (OUTPATIENT)
Age: 65
End: 2025-05-17

## 2025-05-17 ENCOUNTER — HOSPITAL ENCOUNTER (EMERGENCY)
Age: 65
Discharge: HOME | End: 2025-05-17
Payer: MEDICARE

## 2025-05-17 VITALS
OXYGEN SATURATION: 98 % | HEART RATE: 58 BPM | BODY MASS INDEX: 26.41 KG/M2 | DIASTOLIC BLOOD PRESSURE: 94 MMHG | RESPIRATION RATE: 16 BRPM | TEMPERATURE: 98.8 F | HEIGHT: 72 IN | SYSTOLIC BLOOD PRESSURE: 159 MMHG | WEIGHT: 195 LBS

## 2025-05-17 DIAGNOSIS — S61.411A LACERATION OF RIGHT HAND WITHOUT FOREIGN BODY, INITIAL ENCOUNTER: Primary | ICD-10-CM

## 2025-05-17 PROCEDURE — 73130 X-RAY EXAM OF HAND: CPT

## 2025-05-17 PROCEDURE — 12002 RPR S/N/AX/GEN/TRNK2.6-7.5CM: CPT

## 2025-05-17 PROCEDURE — 99284 EMERGENCY DEPT VISIT MOD MDM: CPT

## 2025-05-17 PROCEDURE — 2580000003 HC RX 258

## 2025-05-17 PROCEDURE — 6360000002 HC RX W HCPCS

## 2025-05-17 PROCEDURE — 96366 THER/PROPH/DIAG IV INF ADDON: CPT

## 2025-05-17 PROCEDURE — 90714 TD VACC NO PRESV 7 YRS+ IM: CPT

## 2025-05-17 PROCEDURE — 90471 IMMUNIZATION ADMIN: CPT

## 2025-05-17 PROCEDURE — 96375 TX/PRO/DX INJ NEW DRUG ADDON: CPT

## 2025-05-17 PROCEDURE — 96365 THER/PROPH/DIAG IV INF INIT: CPT

## 2025-05-17 RX ORDER — DOCUSATE SODIUM 100 MG/1
100 CAPSULE, LIQUID FILLED ORAL 2 TIMES DAILY
Qty: 14 CAPSULE | Refills: 0 | Status: SHIPPED | OUTPATIENT
Start: 2025-05-17 | End: 2025-05-24

## 2025-05-17 RX ORDER — LIDOCAINE HYDROCHLORIDE AND EPINEPHRINE 10; 10 MG/ML; UG/ML
20 INJECTION, SOLUTION INFILTRATION; PERINEURAL ONCE
Status: COMPLETED | OUTPATIENT
Start: 2025-05-17 | End: 2025-05-17

## 2025-05-17 RX ORDER — ONDANSETRON 2 MG/ML
4 INJECTION INTRAMUSCULAR; INTRAVENOUS
Status: COMPLETED | OUTPATIENT
Start: 2025-05-17 | End: 2025-05-17

## 2025-05-17 RX ORDER — HYDROMORPHONE HYDROCHLORIDE 1 MG/ML
0.5 INJECTION, SOLUTION INTRAMUSCULAR; INTRAVENOUS; SUBCUTANEOUS
Refills: 0 | Status: COMPLETED | OUTPATIENT
Start: 2025-05-17 | End: 2025-05-17

## 2025-05-17 RX ORDER — CEPHALEXIN 500 MG/1
500 CAPSULE ORAL 2 TIMES DAILY
Qty: 14 CAPSULE | Refills: 0 | Status: SHIPPED | OUTPATIENT
Start: 2025-05-17 | End: 2025-05-24

## 2025-05-17 RX ORDER — ONDANSETRON 4 MG/1
4 TABLET, ORALLY DISINTEGRATING ORAL 3 TIMES DAILY PRN
Qty: 21 TABLET | Refills: 0 | Status: SHIPPED | OUTPATIENT
Start: 2025-05-17 | End: 2025-05-24

## 2025-05-17 RX ORDER — HYDROCODONE BITARTRATE AND ACETAMINOPHEN 5; 325 MG/1; MG/1
1 TABLET ORAL EVERY 6 HOURS PRN
Qty: 12 TABLET | Refills: 0 | Status: SHIPPED | OUTPATIENT
Start: 2025-05-17 | End: 2025-05-20

## 2025-05-17 RX ADMIN — ONDANSETRON 4 MG: 2 INJECTION, SOLUTION INTRAMUSCULAR; INTRAVENOUS at 10:35

## 2025-05-17 RX ADMIN — HYDROMORPHONE HYDROCHLORIDE 0.5 MG: 1 INJECTION, SOLUTION INTRAMUSCULAR; INTRAVENOUS; SUBCUTANEOUS at 10:36

## 2025-05-17 RX ADMIN — CEFAZOLIN SODIUM 2000 MG: 1 INJECTION, POWDER, FOR SOLUTION INTRAMUSCULAR; INTRAVENOUS at 10:46

## 2025-05-17 RX ADMIN — LIDOCAINE HYDROCHLORIDE,EPINEPHRINE BITARTRATE 20 ML: 10; .01 INJECTION, SOLUTION INFILTRATION; PERINEURAL at 12:16

## 2025-05-17 RX ADMIN — CLOSTRIDIUM TETANI TOXOID ANTIGEN (FORMALDEHYDE INACTIVATED) AND CORYNEBACTERIUM DIPHTHERIAE TOXOID ANTIGEN (FORMALDEHYDE INACTIVATED) 0.5 ML: 5; 2 INJECTION, SUSPENSION INTRAMUSCULAR at 10:40

## 2025-05-17 ASSESSMENT — PAIN DESCRIPTION - DESCRIPTORS: DESCRIPTORS: ACHING

## 2025-05-17 ASSESSMENT — PAIN DESCRIPTION - ORIENTATION
ORIENTATION: RIGHT
ORIENTATION: RIGHT

## 2025-05-17 ASSESSMENT — PAIN - FUNCTIONAL ASSESSMENT
PAIN_FUNCTIONAL_ASSESSMENT: 0-10
PAIN_FUNCTIONAL_ASSESSMENT: PREVENTS OR INTERFERES SOME ACTIVE ACTIVITIES AND ADLS

## 2025-05-17 ASSESSMENT — PAIN DESCRIPTION - PAIN TYPE: TYPE: ACUTE PAIN

## 2025-05-17 ASSESSMENT — PAIN DESCRIPTION - LOCATION
LOCATION: HEAD
LOCATION: HAND

## 2025-05-17 ASSESSMENT — PAIN SCALES - GENERAL
PAINLEVEL_OUTOF10: 4
PAINLEVEL_OUTOF10: 3

## 2025-05-17 ASSESSMENT — LIFESTYLE VARIABLES: HOW MANY STANDARD DRINKS CONTAINING ALCOHOL DO YOU HAVE ON A TYPICAL DAY: PATIENT DOES NOT DRINK

## 2025-05-17 NOTE — PROGRESS NOTES
[facility] ED call  Patient ID:  Name: Gabriel Wayne  MRN: [id number]  AGE: 64 y.o.  : 1960      Subjective: [facility] ED consulted for LHD male with cut to dorsal aspect of right 2nd MCP via table saw 25. NV intact, unable to extend at 2nd MCP actively. Xrays no fracture. Wound irrigated with 2 L saline with povidine    Pt received 2 g IV ancef    ED consulted for further recommendations and I consulted with . Friend     ALLERGIES: No Known Allergies     Patient Medications    Current Outpatient Medications   Medication Sig    cephALEXin (KEFLEX) 500 MG capsule Take 1 capsule by mouth 2 times daily for 7 days    HYDROcodone-acetaminophen (NORCO) 5-325 MG per tablet Take 1 tablet by mouth every 6 hours as needed for Pain for up to 3 days. Intended supply: 3 days. Take lowest dose possible to manage pain Max Daily Amount: 4 tablets    ondansetron (ZOFRAN-ODT) 4 MG disintegrating tablet Take 1 tablet by mouth 3 times daily as needed for Nausea or Vomiting    docusate sodium (COLACE) 100 MG capsule Take 1 capsule by mouth 2 times daily for 7 days    fluticasone (FLONASE) 50 MCG/ACT nasal spray 2 sprays by Each Nostril route daily    nystatin (MYCOSTATIN) 396465 UNIT/GM powder Apply topically 2 times daily    Lancets MISC Check blood glucose daily in am (Patient not taking: Reported on 2025)     No current facility-administered medications for this visit.             X-ray: Xray Result (most recent):  XR HAND RIGHT (MIN 3 VIEWS) 2025    Narrative  Right hand    INDICATION: Laceration dorsal aspect second MCP joint eval fracture    COMPARISON:  None    TECHNIQUE: Three views of the right hand were obtained.    FINDINGS: Second digit PIP joint osteoarthrosis. No fracture,, subluxation, or  dislocation identified.    Impression  Second digit PIP joint osteoarthrosis. No acute osseous abnormalities.      Electronically signed by Dexter Cook

## 2025-05-17 NOTE — DISCHARGE INSTRUCTIONS
You are seen today for cut to your right hand via table saw    We updated your tetanus  We gave you 2 g of IV antibiotic  She did receive some IV narcotics today.  Your wife will have to drive home.  If you are in alcohol drinker do not drink alcohol today.    I spoke with Rosy Moody the physician assistant at [facility] regarding your injury and she discussed your case with orthopedic hand surgeon, Dr. Brown.  Per their instruction 5 sutures were placed loosely antibiotic dressing placed over the wound and your hand is been bandaged    Keep this bandage on your hand until you follow-up with orthopedics.  Keep the bandage dry    I have written you for an antibiotic called Keflex to start tomorrow    I have written you for a narcotic pain medication called Norco.  Do not drink alcohol or drive on this medication it will cause grogginess, fogginess, sleepiness    Narcotics cause constipation therefore I wrote you for Colace    Narcotics also cause nausea therefore I wrote you for Zofran    Recommend that you contact [facility] on Monday if you have not heard from them by 9 AM.  I have included 2 different contact numbers to call.  Let them know that you were seen in the emergency department and you need to follow-up with Dr. Brown after speaking with physician assistant Rosy Moody    Please return to the emergency department if redness is streaking up your arm, you are developing fevers without other signs of infection    Please return of course for any chest pain, shortness of breath, signs or symptoms of stroke as this your discharge paperwork    I think you guys for your patience today.  It was real pleasure to meet you

## 2025-05-17 NOTE — ED NOTES
Pt became very diaphoretic and his eyes appeared to roll back in his head. Placed placed in RM6.  States that he has not eaten today . Coke and crackers given

## 2025-05-17 NOTE — ED PROVIDER NOTES
Hemostasis achieved with:  Direct pressure    Imaging obtained: x-ray      Imaging outcome: foreign body not noted      Wound exploration: wound explored through full range of motion and entire depth of wound visualized      Wound extent: areolar tissue violated, tendon damage and underlying fracture (Possible avulsion fracture)      Wound extent: no foreign bodies/material noted, no muscle damage noted, no nerve damage noted and no vascular damage noted      Tendon damage location:  Upper extremity    Upper extremity tendon damage location:  Finger extensor    Finger extensor tendon:  Extensor indicis    Tendon damage extent:  Complete transection    Tendon repair plan:  Refer for evaluation    Contaminated: no    Treatment:     Area cleansed with:  Povidone-iodine and saline    Amount of cleaning:  Extensive    Irrigation solution:  Sterile saline (Mixed with Povidine)    Irrigation volume:  2000cc    Irrigation method:  Tap    Visualized foreign bodies/material removed: no      Debridement:  None    Undermining:  None  Skin repair:     Repair method:  Sutures    Suture size:  4-0    Suture material:  Prolene    Suture technique:  Simple interrupted    Number of sutures:  5  Approximation:     Approximation:  Loose  Repair type:     Repair type:  Simple  Post-procedure details:     Dressing:  Bulky dressing and non-adherent dressing    Procedure completion:  Tolerated well, no immediate complications  Comments:      Xeroform over the wound covered with nonadherent dressing and bulky dressing along with an Ace wrap      Orders placed during this emergency department visit:     Orders Placed This Encounter   Procedures    LACERATION REPAIR    XR HAND RIGHT (MIN 3 VIEWS)    [facility]    Insert peripheral IV        Medications given during this emergency department visit:     Medications   ceFAZolin (ANCEF) 2,000 mg in sodium chloride 0.9 % 50 mL IVPB (0 mg IntraVENous Stopped  subluxation, or  dislocation identified.      Impression    Second digit PIP joint osteoarthrosis. No acute osseous abnormalities.      Electronically signed by Dexter Cook MD         XR HAND RIGHT (MIN 3 VIEWS)   Final Result   Second digit PIP joint osteoarthrosis. No acute osseous abnormalities.         Electronically signed by Dexter Cook MD                   No results for input(s): \"COVID19\" in the last 72 hours.     Voice dictation software was used during the making of this note.  This software is not perfect and grammatical and other typographical errors may be present.  This note has not been completely proofread for errors.       Ana Velez PA  05/17/25 [id number]

## 2025-05-19 ENCOUNTER — OFFICE VISIT (OUTPATIENT)
Age: 65
End: 2025-05-19
Payer: MEDICARE

## 2025-05-19 ENCOUNTER — TELEPHONE (OUTPATIENT)
Dept: ORTHOPEDIC SURGERY | Age: 65
End: 2025-05-19

## 2025-05-19 DIAGNOSIS — S61.209A EXTENSOR TENDON LACERATION OF FINGER WITH OPEN WOUND, INITIAL ENCOUNTER: Primary | ICD-10-CM

## 2025-05-19 DIAGNOSIS — S56.429A EXTENSOR TENDON LACERATION OF FINGER WITH OPEN WOUND, INITIAL ENCOUNTER: Primary | ICD-10-CM

## 2025-05-19 PROCEDURE — G8419 CALC BMI OUT NRM PARAM NOF/U: HCPCS | Performed by: ORTHOPAEDIC SURGERY

## 2025-05-19 PROCEDURE — 3017F COLORECTAL CA SCREEN DOC REV: CPT | Performed by: ORTHOPAEDIC SURGERY

## 2025-05-19 PROCEDURE — 99204 OFFICE O/P NEW MOD 45 MIN: CPT | Performed by: ORTHOPAEDIC SURGERY

## 2025-05-19 PROCEDURE — G8427 DOCREV CUR MEDS BY ELIG CLIN: HCPCS | Performed by: ORTHOPAEDIC SURGERY

## 2025-05-19 PROCEDURE — 1159F MED LIST DOCD IN RCRD: CPT | Performed by: ORTHOPAEDIC SURGERY

## 2025-05-19 PROCEDURE — 1123F ACP DISCUSS/DSCN MKR DOCD: CPT | Performed by: ORTHOPAEDIC SURGERY

## 2025-05-19 PROCEDURE — 1036F TOBACCO NON-USER: CPT | Performed by: ORTHOPAEDIC SURGERY

## 2025-05-19 RX ORDER — SENNOSIDES 8.6 MG
650 CAPSULE ORAL EVERY 8 HOURS PRN
COMMUNITY

## 2025-05-19 NOTE — PERIOP NOTE
Patient verified name and .  Order for consent  was not found in EHR; patient verifies procedure.   Type 1B surgery,  assessment complete.  Orders not received.  Labs per surgeon: unknown  Labs per anesthesia protocol: none    Patient answered medical/surgical history questions at their best of ability. All prior to admission medications documented in EPIC.    Patient instructed to continue taking all prescription medications up to the day of surgery but to take ONLY the following medications the day of surgery according to anesthesia guidelines with a small sip of water: cephalexin (Keflex) and  if needed hydrocodone-acetaminophen (Norco)   Continue all rx medication on the day/night prior to procedure date unless specifically instructed below.  Also, patient is requested to take 2 Tylenol in the morning and then again before bed on the day before surgery. Regular or extra strength may be used.       Patient informed that all vitamins and supplements should be held 7 days prior to surgery and NSAIDS 5 days prior to surgery. Prescription meds to hold:none    Patient instructed on the following:    > Arrive at [address] [place] [address] ([address])[place] [address]. Front of building reads Outpatient. [address] is just inside the entrance on the right.  Entrance, time of arrival to be called the day before by 1700  > No food after midnight, patient may drink clear liquids only up until 2 hours prior to arrival. No gum, candy, mints.   > Responsible adult must drive patient to the hospital, stay during surgery, and patient will need supervision 24 hours after anesthesia  > Use non moisturizing soap in shower the night before surgery and on the morning of surgery  > All piercings must be removed prior to arrival.    > Leave all valuables (money and jewelry) at home but bring insurance card and ID on DOS.   > You may be required to pay a deductible or co-pay on the day of your procedure. You can pre-pay by calling

## 2025-05-19 NOTE — TELEPHONE ENCOUNTER
Urgent ED referral    Laceration of right hand without foreign body,   MEF aware of this one  Please review and advise  Thank you

## 2025-05-20 ENCOUNTER — ANESTHESIA EVENT (OUTPATIENT)
Dept: SURGERY | Age: 65
End: 2025-05-20
Payer: MEDICARE

## 2025-05-20 NOTE — H&P (VIEW-ONLY)
Orthopaedic Hand Clinic Note    Name: Gabriel Wayne  YOB: 1960  Gender: male  MRN: [id number]      CC: Patient referred for evaluation of upper extremity pain    HPI: Gabriel Wayne is a 64 y.o. male with a chief complaint of injury to his right hand which occurred when he was using a table saw.  The patient was seen in the emergency department.  X-rays were obtained.  He was told he had cut his tendon.  Wound was cleaned and closed.  He was discharged on antibiotics..  He has a history of prior injury to his index finger with a nail gun.      ROS/Meds/PSH/PMH/FH/SH: I personally reviewed the patients standard intake form.  Pertinents are discussed in the HPI    Physical Examination:    Musculoskeletal Exam:  Examination on the right upper extremity demonstrates cap refill < 5 seconds in all fingers, there is a complex wound over the dorsal aspect of the second metacarpophalangeal joint.  Sutures are in place.  There is no erythema or drainage.  Wound edges are slightly macerated.  He is unable to perform active extension of the index MCP.  He has limited active flexion of the MCP and PIP joints which he says is normal. He has minimal pain with MCP flexion    Imaging / Electrodiagnostic Tests:     I independently reviewed and interpreted right hand radiographs.  They demonstrate no acute fracture or dislocation.  There are scattered degenerative changes particular in the distal interphalangeal joints, as well as at the second MCP and index PIP joint.  There are well-circumscribed ossific densities at the volar radial aspect of the wrist suggestive of calcific tendinitis      Assessment:     ICD-10-CM    1. Extensor tendon laceration of finger with open wound, initial encounter  S56.429A Ambulatory referral to Occupational Therapy    S61.209A Case Request          Plan:   We discussed the diagnosis and different treatment options. We discussed observation, therapy, antiinflammatory medications

## 2025-05-20 NOTE — PERIOP NOTE
Preop department called to notify patient of arrival time for scheduled procedure. Instructions given to   - Arrive at OPC Entrance [address].  - Nothing to eat after midnight unless otherwise indicated. No gum, mints, or ice chips. You may have clear liquids two hours prior to arrival to the hospital.   - Have a responsible adult to drive patient to the hospital, stay during surgery, and patient will need supervision 24 hours after anesthesia.   - Use antibacterial soap in shower the night before surgery and on the morning of surgery.       Was patient contacted: yes, pt  Voicemail left: n/a  Numbers contacted: [phone]   Arrival time: 0830  Time to stop clear liquids: 0630

## 2025-05-20 NOTE — PROGRESS NOTES
Orthopaedic Hand Clinic Note    Name: Gabriel Wayne  YOB: 1960  Gender: male  MRN: [id number]      CC: Patient referred for evaluation of upper extremity pain    HPI: Gabriel Wayne is a 64 y.o. male with a chief complaint of injury to his right hand which occurred when he was using a table saw.  The patient was seen in the emergency department.  X-rays were obtained.  He was told he had cut his tendon.  Wound was cleaned and closed.  He was discharged on antibiotics..  He has a history of prior injury to his index finger with a nail gun.      ROS/Meds/PSH/PMH/FH/SH: I personally reviewed the patients standard intake form.  Pertinents are discussed in the HPI    Physical Examination:    Musculoskeletal Exam:  Examination on the right upper extremity demonstrates cap refill < 5 seconds in all fingers, there is a complex wound over the dorsal aspect of the second metacarpophalangeal joint.  Sutures are in place.  There is no erythema or drainage.  Wound edges are slightly macerated.  He is unable to perform active extension of the index MCP.  He has limited active flexion of the MCP and PIP joints which he says is normal. He has minimal pain with MCP flexion    Imaging / Electrodiagnostic Tests:     I independently reviewed and interpreted right hand radiographs.  They demonstrate no acute fracture or dislocation.  There are scattered degenerative changes particular in the distal interphalangeal joints, as well as at the second MCP and index PIP joint.  There are well-circumscribed ossific densities at the volar radial aspect of the wrist suggestive of calcific tendinitis      Assessment:     ICD-10-CM    1. Extensor tendon laceration of finger with open wound, initial encounter  S56.429A Ambulatory referral to Occupational Therapy    S61.209A Case Request          Plan:   We discussed the diagnosis and different treatment options. We discussed observation, therapy, antiinflammatory medications

## 2025-05-21 ENCOUNTER — ANESTHESIA (OUTPATIENT)
Dept: SURGERY | Age: 65
End: 2025-05-21
Payer: MEDICARE

## 2025-05-21 ENCOUNTER — HOSPITAL ENCOUNTER (OUTPATIENT)
Age: 65
Setting detail: OUTPATIENT SURGERY
Discharge: HOME | End: 2025-05-21
Attending: ORTHOPAEDIC SURGERY | Admitting: ORTHOPAEDIC SURGERY
Payer: MEDICARE

## 2025-05-21 ENCOUNTER — TELEPHONE (OUTPATIENT)
Dept: ORTHOPEDIC SURGERY | Age: 65
End: 2025-05-21

## 2025-05-21 VITALS
HEART RATE: 54 BPM | DIASTOLIC BLOOD PRESSURE: 99 MMHG | TEMPERATURE: 97.5 F | RESPIRATION RATE: 16 BRPM | HEIGHT: 72 IN | WEIGHT: 195 LBS | OXYGEN SATURATION: 99 % | SYSTOLIC BLOOD PRESSURE: 184 MMHG | BODY MASS INDEX: 26.41 KG/M2

## 2025-05-21 DIAGNOSIS — S61.209A EXTENSOR TENDON LACERATION OF FINGER WITH OPEN WOUND, INITIAL ENCOUNTER: Primary | ICD-10-CM

## 2025-05-21 DIAGNOSIS — S56.429A EXTENSOR TENDON LACERATION OF FINGER WITH OPEN WOUND, INITIAL ENCOUNTER: Primary | ICD-10-CM

## 2025-05-21 PROCEDURE — 7100000001 HC PACU RECOVERY - ADDTL 15 MIN: Performed by: ORTHOPAEDIC SURGERY

## 2025-05-21 PROCEDURE — 6360000002 HC RX W HCPCS: Performed by: ORTHOPAEDIC SURGERY

## 2025-05-21 PROCEDURE — 7100000011 HC PHASE II RECOVERY - ADDTL 15 MIN: Performed by: ORTHOPAEDIC SURGERY

## 2025-05-21 PROCEDURE — 3600000012 HC SURGERY LEVEL 2 ADDTL 15MIN: Performed by: ORTHOPAEDIC SURGERY

## 2025-05-21 PROCEDURE — 3700000001 HC ADD 15 MINUTES (ANESTHESIA): Performed by: ORTHOPAEDIC SURGERY

## 2025-05-21 PROCEDURE — 3700000000 HC ANESTHESIA ATTENDED CARE: Performed by: ORTHOPAEDIC SURGERY

## 2025-05-21 PROCEDURE — 6360000002 HC RX W HCPCS: Performed by: ANESTHESIOLOGY

## 2025-05-21 PROCEDURE — 2580000003 HC RX 258: Performed by: ANESTHESIOLOGY

## 2025-05-21 PROCEDURE — 6370000000 HC RX 637 (ALT 250 FOR IP): Performed by: ANESTHESIOLOGY

## 2025-05-21 PROCEDURE — 7100000000 HC PACU RECOVERY - FIRST 15 MIN: Performed by: ORTHOPAEDIC SURGERY

## 2025-05-21 PROCEDURE — 2500000003 HC RX 250 WO HCPCS: Performed by: NURSE ANESTHETIST, CERTIFIED REGISTERED

## 2025-05-21 PROCEDURE — 6360000002 HC RX W HCPCS: Performed by: NURSE ANESTHETIST, CERTIFIED REGISTERED

## 2025-05-21 PROCEDURE — 7100000010 HC PHASE II RECOVERY - FIRST 15 MIN: Performed by: ORTHOPAEDIC SURGERY

## 2025-05-21 PROCEDURE — 2709999900 HC NON-CHARGEABLE SUPPLY: Performed by: ORTHOPAEDIC SURGERY

## 2025-05-21 PROCEDURE — 3600000002 HC SURGERY LEVEL 2 BASE: Performed by: ORTHOPAEDIC SURGERY

## 2025-05-21 RX ORDER — SODIUM CHLORIDE 0.9 % (FLUSH) 0.9 %
5-40 SYRINGE (ML) INJECTION EVERY 12 HOURS SCHEDULED
Status: DISCONTINUED | OUTPATIENT
Start: 2025-05-21 | End: 2025-05-21 | Stop reason: HOSPADM

## 2025-05-21 RX ORDER — NALOXONE HYDROCHLORIDE 0.4 MG/ML
INJECTION, SOLUTION INTRAMUSCULAR; INTRAVENOUS; SUBCUTANEOUS PRN
Status: DISCONTINUED | OUTPATIENT
Start: 2025-05-21 | End: 2025-05-21 | Stop reason: HOSPADM

## 2025-05-21 RX ORDER — SODIUM CHLORIDE, SODIUM LACTATE, POTASSIUM CHLORIDE, CALCIUM CHLORIDE 600; 310; 30; 20 MG/100ML; MG/100ML; MG/100ML; MG/100ML
INJECTION, SOLUTION INTRAVENOUS CONTINUOUS
Status: DISCONTINUED | OUTPATIENT
Start: 2025-05-21 | End: 2025-05-21 | Stop reason: HOSPADM

## 2025-05-21 RX ORDER — SODIUM CHLORIDE 9 MG/ML
INJECTION, SOLUTION INTRAVENOUS PRN
Status: DISCONTINUED | OUTPATIENT
Start: 2025-05-21 | End: 2025-05-21 | Stop reason: HOSPADM

## 2025-05-21 RX ORDER — MIDAZOLAM HYDROCHLORIDE 2 MG/2ML
2 INJECTION, SOLUTION INTRAMUSCULAR; INTRAVENOUS
Status: DISCONTINUED | OUTPATIENT
Start: 2025-05-21 | End: 2025-05-21 | Stop reason: HOSPADM

## 2025-05-21 RX ORDER — PROCHLORPERAZINE EDISYLATE 5 MG/ML
5 INJECTION INTRAMUSCULAR; INTRAVENOUS
Status: DISCONTINUED | OUTPATIENT
Start: 2025-05-21 | End: 2025-05-21 | Stop reason: HOSPADM

## 2025-05-21 RX ORDER — OXYCODONE HYDROCHLORIDE 5 MG/1
5 TABLET ORAL
Status: COMPLETED | OUTPATIENT
Start: 2025-05-21 | End: 2025-05-21

## 2025-05-21 RX ORDER — SODIUM CHLORIDE 0.9 % (FLUSH) 0.9 %
5-40 SYRINGE (ML) INJECTION PRN
Status: DISCONTINUED | OUTPATIENT
Start: 2025-05-21 | End: 2025-05-21 | Stop reason: HOSPADM

## 2025-05-21 RX ORDER — DEXMEDETOMIDINE HYDROCHLORIDE 100 UG/ML
INJECTION, SOLUTION INTRAVENOUS
Status: DISCONTINUED | OUTPATIENT
Start: 2025-05-21 | End: 2025-05-21 | Stop reason: SDUPTHER

## 2025-05-21 RX ORDER — LIDOCAINE HYDROCHLORIDE 10 MG/ML
INJECTION, SOLUTION INFILTRATION; PERINEURAL PRN
Status: DISCONTINUED | OUTPATIENT
Start: 2025-05-21 | End: 2025-05-21 | Stop reason: ALTCHOICE

## 2025-05-21 RX ORDER — PROPOFOL 10 MG/ML
INJECTION, EMULSION INTRAVENOUS
Status: DISCONTINUED | OUTPATIENT
Start: 2025-05-21 | End: 2025-05-21 | Stop reason: SDUPTHER

## 2025-05-21 RX ORDER — LIDOCAINE HYDROCHLORIDE 10 MG/ML
1 INJECTION, SOLUTION INFILTRATION; PERINEURAL
Status: DISCONTINUED | OUTPATIENT
Start: 2025-05-21 | End: 2025-05-21 | Stop reason: HOSPADM

## 2025-05-21 RX ORDER — BUPIVACAINE HYDROCHLORIDE 2.5 MG/ML
INJECTION, SOLUTION EPIDURAL; INFILTRATION; INTRACAUDAL; PERINEURAL PRN
Status: DISCONTINUED | OUTPATIENT
Start: 2025-05-21 | End: 2025-05-21 | Stop reason: ALTCHOICE

## 2025-05-21 RX ORDER — FENTANYL CITRATE 50 UG/ML
100 INJECTION, SOLUTION INTRAMUSCULAR; INTRAVENOUS
Status: DISCONTINUED | OUTPATIENT
Start: 2025-05-21 | End: 2025-05-21 | Stop reason: HOSPADM

## 2025-05-21 RX ORDER — HYDROCODONE BITARTRATE AND ACETAMINOPHEN 5; 325 MG/1; MG/1
1 TABLET ORAL EVERY 6 HOURS PRN
Qty: 15 TABLET | Refills: 0 | Status: SHIPPED | OUTPATIENT
Start: 2025-05-21 | End: 2025-05-26

## 2025-05-21 RX ADMIN — Medication 2 G: at 10:48

## 2025-05-21 RX ADMIN — PROPOFOL 30 MG: 10 INJECTION, EMULSION INTRAVENOUS at 10:48

## 2025-05-21 RX ADMIN — SODIUM CHLORIDE, SODIUM LACTATE, POTASSIUM CHLORIDE, AND CALCIUM CHLORIDE: .6; .31; .03; .02 INJECTION, SOLUTION INTRAVENOUS at 09:20

## 2025-05-21 RX ADMIN — OXYCODONE 5 MG: 5 TABLET ORAL at 11:34

## 2025-05-21 RX ADMIN — PROPOFOL 30 MG: 10 INJECTION, EMULSION INTRAVENOUS at 10:52

## 2025-05-21 RX ADMIN — PROPOFOL 50 MG: 10 INJECTION, EMULSION INTRAVENOUS at 10:46

## 2025-05-21 RX ADMIN — DEXMEDETOMIDINE 12 MCG: 100 INJECTION, SOLUTION, CONCENTRATE INTRAVENOUS at 10:46

## 2025-05-21 RX ADMIN — DEXMEDETOMIDINE 8 MCG: 100 INJECTION, SOLUTION, CONCENTRATE INTRAVENOUS at 10:52

## 2025-05-21 RX ADMIN — PROPOFOL 50 MG: 10 INJECTION, EMULSION INTRAVENOUS at 11:08

## 2025-05-21 ASSESSMENT — PAIN DESCRIPTION - LOCATION
LOCATION: HAND
LOCATION: HAND

## 2025-05-21 ASSESSMENT — PAIN DESCRIPTION - ORIENTATION
ORIENTATION: RIGHT
ORIENTATION: RIGHT

## 2025-05-21 ASSESSMENT — PAIN - FUNCTIONAL ASSESSMENT: PAIN_FUNCTIONAL_ASSESSMENT: 0-10

## 2025-05-21 ASSESSMENT — PAIN SCALES - GENERAL
PAINLEVEL_OUTOF10: 4
PAINLEVEL_OUTOF10: 8

## 2025-05-21 NOTE — ANESTHESIA POSTPROCEDURE EVALUATION
Department of Anesthesiology  Postprocedure Note    Patient: Gabriel Wayne  MRN: [id number]  YOB: 1960  Date of evaluation: 5/21/2025    Procedure Summary       Date: 05/21/25 Room / Location: [facility] OP OR 06 / [facility]    Anesthesia Start: 1039 Anesthesia Stop: 1129    Procedure: Right index finger extensor tendon repair, metocarpophalangeal arthrotomy, debridement (Right: Hand) Diagnosis:       Extensor tendon laceration of finger with open wound      (Extensor tendon laceration of finger with open wound [S56.429A, S61.209A])    Surgeons: Marissa Brown MD Responsible Provider: Jam Auguste MD    Anesthesia Type: TIVA ASA Status: 2            Anesthesia Type: TIVA    Demetris Phase I: Demetris Score: 10    Demetris Phase II: Demetris Score: 10    Anesthesia Post Evaluation    Patient location during evaluation: PACU  Patient participation: complete - patient participated  Level of consciousness: awake and alert  Airway patency: patent  Nausea & Vomiting: no nausea and no vomiting  Cardiovascular status: hemodynamically stable  Respiratory status: acceptable, nonlabored ventilation and spontaneous ventilation  Hydration status: euvolemic  Comments: /86   Pulse 54   Temp 97.5 °F (36.4 °C) (Temporal)   Resp 16   Ht 1.829 m (6')   Wt 88.5 kg (195 lb)   SpO2 99%   BMI 26.45 kg/m²     Multimodal analgesia pain management approach  Pain management: adequate and satisfactory to patient    No notable events documented.

## 2025-05-21 NOTE — OP NOTE
ORTHOPAEDIC SURGICAL NOTE        Gabriel Wayne male 64 y.o.  [id number]   05/21/25    PRE-OP DIAGNOSIS: right index finger traumatic arthrotomy of metacarpophalangeal joint, open fracture right second metacarpal head, extensor tendon laceration in zone 5    POST-OP DIAGNOSIS: Same  LATERALITY: right    PROCEDURES PERFORMED:   Repair of right index extensor tendon in zone 5   Irrigation and debridement of open fracture of second metacarpal head and second MCP traumatic arthrotomy       SURGEON:   Marissa Brown MD     IMPLANTS:   * No implants in log *   Procedure(s):  Right index finger extensor tendon repair, metocarpophalangeal arthrotomy, debridement   Surgeon(s):  Marissa Brown MD   Procedure(s) with comments:  Right index finger extensor tendon repair, metocarpophalangeal arthrotomy, debridement - with local     ANESTHESIA: Monitor Anesthesia Care     STAFF:    Circulator: Sidra Beckham RN  Scrub Person First: Cammie Schulz     ESTIMATED BLOOD LOSS: Minimal       TOTAL IV FLUIDS : See anesthesia note    COMPLICATIONS: None     TOURNIQUET TIME:   Total Tourniquet Time Documented:  Arm  (Right) - 19 minutes  Total: Arm  (Right) - 19 minutes       INDICATION FOR PROCEDURE:     Gabriel Wayne sustained an injury to the right hand with a saw which resulted in the above injuries.  Surgical and non-surgical treatment options were discussed with the patient and their family, as well as the risk and benefits of each option. After thorough discussion, the patient decided to proceed with surgical management.  Specific to this treatment plan, we discussed in detail surgical risks including scar, pain, bleeding, infection, anesthetic risks, neurovascular injury, failure to achieve desired results, hardware problems, need for further surgery,  weakness, stiffness, risk of death and potential risk of other unforseen complication.       The patient consented to the procedure after discussion of the risks and benefits.

## 2025-05-21 NOTE — TELEPHONE ENCOUNTER
I spoke with Mr. Wayne and he stated that they gave him pain medication before he left the hospital and his wife gave him one when they got home b/c the first one wasn't working.  He said it is now over 2 hrs and the pain is the same.  I explained that the first 24 hrs are usually the worse.  I also told him he could take over the counter Advil, Motrin, or Ibuprofen in between the doses of Norco.  I told him not to take Tylenol. He is keeping his hand elevated as well. He voiced understanding.  He will call us back tomorrow if he feels the pain medication is still not working.

## 2025-05-21 NOTE — ANESTHESIA PRE PROCEDURE
Department of Anesthesiology  Preprocedure Note       Name:  Gabriel Wayne   Age:  64 y.o.  :  1960                                          MRN:  [id number]         Date:  2025      Surgeon: Surgeon(s):  Marissa Brown MD    Procedure: Procedure(s):  Right index finger extensor tendon repair, metocarpophalangeal arthrotomy, debridement    Medications prior to admission:   Prior to Admission medications    Medication Sig Start Date End Date Taking? Authorizing Provider   acetaminophen (TYLENOL 8 HOUR) 650 MG extended release tablet Take 1 tablet by mouth every 8 hours as needed for Pain   Yes Provider, MD Graham   cephALEXin (KEFLEX) 500 MG capsule Take 1 capsule by mouth 2 times daily for 7 days 25 Yes Ana Velez PA   docusate sodium (COLACE) 100 MG capsule Take 1 capsule by mouth 2 times daily for 7 days 25 Yes Ana Velez PA   ondansetron (ZOFRAN-ODT) 4 MG disintegrating tablet Take 1 tablet by mouth 3 times daily as needed for Nausea or Vomiting  Patient not taking: Reported on 2025  Ana Velez PA   fluticasone (FLONASE) 50 MCG/ACT nasal spray 2 sprays by Each Nostril route daily  Patient not taking: Reported on 2025   Hyun Miles APRN - CNP   nystatin (MYCOSTATIN) 315274 UNIT/GM powder Apply topically 2 times daily  Patient not taking: Reported on 2025 3/24/25   Hyun Miles APRN - CNP   Lancets MISC Check blood glucose daily in am  Patient not taking: Reported on 2025   Automatic Reconciliation, Ar       Current medications:    Current Facility-Administered Medications   Medication Dose Route Frequency Provider Last Rate Last Admin   • ceFAZolin (ANCEF) 2000 mg in sterile water 20 mL IV syringe  2,000 mg IntraVENous On Call to OR Marissa Brown MD       • sodium chloride flush 0.9 % injection 5-40 mL  5-40 mL IntraVENous 2 times per day Marissa Brown MD       • sodium

## 2025-05-26 NOTE — PROGRESS NOTES
[facility] ORTHOPAEDICS  [address] [place] [id number]  Dept: [phone]      Occupational Therapy Initial Assessment     Referring MD: Marissa Brown MD    Diagnosis:     ICD-10-CM    1. Decreased range of motion  M25.60       2. Decreased activities of daily living (ADL)  Z78.9       3. Weakness of right hand  R29.898            Surgery: Date 5/21/25     Therapy precautions: Tendon precautions and Fracture precautions    History of injury/onset : The patient is a 64 year old male s/p injury to right hand with table saw resulting in right index finger traumatic arthrotomy of metacarpophalangeal joint, open fracture right second metacarpal head, extensor tendon laceration in zone 5.  He underwent repair on 5/21/25.     Payor: Payor: [employer] /  /  /  Billing pattern: Government- total time   Total Direct Treatment Time: 15 min                       Total In Office Time: 60 min  Modifier needed: No  Episode visit count:  1     Preferred Name:  Walker    PERTINENT MEDICAL HISTORY     PMHX & Meds:   Past Medical History:   Diagnosis Date    Blurry vision, bilateral     pt denies currently (2/15/18)    Prediabetes     diet controlled   ,   Past Surgical History:   Procedure Laterality Date    COLONOSCOPY N/A 2/20/2018    COLONOSCOPY/ 27 performed by Noah Smith DO at [facility] [place] [place] ENDOSCOPY    FINGER SURGERY Right 2016    HAND TENDON SURGERY Right 5/21/2025    Right index finger extensor tendon repair, metocarpophalangeal arthrotomy, debridement performed by Marissa Brown MD at [facility] OPC    KNEE ARTHROSCOPY Left 1986      Medications. : Reviewed in chart  Allergies: No Known Allergies     SUBJECTIVE     Current Symptoms/Chief complaints:   Chief Complaint   Patient presents with    Hand Injury       Chief complaint/history of injury:     Number of Therapy Visits within past 90 days: 0  Nature of condition: Recent onset (initial onset within last 3 months)  Primary cause of current

## 2025-05-27 ENCOUNTER — EVALUATION (OUTPATIENT)
Age: 65
End: 2025-05-27
Payer: MEDICARE

## 2025-05-27 DIAGNOSIS — S56.429A EXTENSOR TENDON LACERATION OF FINGER WITH OPEN WOUND, INITIAL ENCOUNTER: ICD-10-CM

## 2025-05-27 DIAGNOSIS — M25.60 DECREASED RANGE OF MOTION: Primary | ICD-10-CM

## 2025-05-27 DIAGNOSIS — Z78.9 DECREASED ACTIVITIES OF DAILY LIVING (ADL): ICD-10-CM

## 2025-05-27 DIAGNOSIS — S61.209A EXTENSOR TENDON LACERATION OF FINGER WITH OPEN WOUND, INITIAL ENCOUNTER: ICD-10-CM

## 2025-05-27 DIAGNOSIS — R29.898 WEAKNESS OF RIGHT HAND: ICD-10-CM

## 2025-05-27 PROCEDURE — 97110 THERAPEUTIC EXERCISES: CPT

## 2025-05-27 PROCEDURE — 97166 OT EVAL MOD COMPLEX 45 MIN: CPT

## 2025-05-27 PROCEDURE — L3808 WHFO, RIGID W/O JOINTS: HCPCS

## 2025-05-29 ENCOUNTER — TREATMENT (OUTPATIENT)
Age: 65
End: 2025-05-29
Payer: MEDICARE

## 2025-05-29 DIAGNOSIS — Z78.9 DECREASED ACTIVITIES OF DAILY LIVING (ADL): ICD-10-CM

## 2025-05-29 DIAGNOSIS — M25.60 DECREASED RANGE OF MOTION: Primary | ICD-10-CM

## 2025-05-29 DIAGNOSIS — R29.898 WEAKNESS OF RIGHT HAND: ICD-10-CM

## 2025-05-29 PROCEDURE — 97110 THERAPEUTIC EXERCISES: CPT

## 2025-05-29 PROCEDURE — 97010 HOT OR COLD PACKS THERAPY: CPT

## 2025-05-30 NOTE — PROGRESS NOTES
[facility] ORTHOPAEDICS  [address] [place] [id number]  Dept: [phone]      Occupational Therapy Daily Note     Referring MD: Marissa Brown MD    Diagnosis:     ICD-10-CM    1. Decreased range of motion  M25.60       2. Decreased activities of daily living (ADL)  Z78.9       3. Weakness of right hand  R29.898            Surgery: Date 5/21/25     Therapy precautions: Tendon precautions and Fracture precautions    History of injury/onset : The patient is a 64 year old male s/p injury to right hand with table saw resulting in right index finger traumatic arthrotomy of metacarpophalangeal joint, open fracture right second metacarpal head, extensor tendon laceration in zone 5.  He underwent repair on 5/21/25.     Payor: Payor: [employer] /  /  /  Billing pattern: Government- total time   Total Direct Treatment Time: 30 min                       Total In Office Time: 40 min  Modifier needed: No  Episode visit count:  2     Preferred Name:  Walker    PERTINENT MEDICAL HISTORY     PMHX & Meds:   Past Medical History:   Diagnosis Date    Blurry vision, bilateral     pt denies currently (2/15/18)    Prediabetes     diet controlled   ,   Past Surgical History:   Procedure Laterality Date    COLONOSCOPY N/A 2/20/2018    COLONOSCOPY/ 27 performed by Noah Smith DO at [facility] [place]COPY    FINGER SURGERY Right 2016    HAND TENDON SURGERY Right 5/21/2025    Right index finger extensor tendon repair, metocarpophalangeal arthrotomy, debridement performed by Marissa Brown MD at [facility] OPC    KNEE ARTHROSCOPY Left 1986      Medications. : Reviewed in chart  Allergies: No Known Allergies     SUBJECTIVE     Current Symptoms/Chief complaints:   Chief Complaint   Patient presents with    Hand Injury     OBJECTIVE     Functional Outcome Measures: Quick Dash  31 score=   45.45 % functional deficit  Hand/Side Dominance: right handed  Digital AROM:  IE IF LF RF SF   AROM    MCP       AROM      PIP

## 2025-06-02 ENCOUNTER — OFFICE VISIT (OUTPATIENT)
Age: 65
End: 2025-06-02

## 2025-06-02 ENCOUNTER — TREATMENT (OUTPATIENT)
Age: 65
End: 2025-06-02
Payer: MEDICARE

## 2025-06-02 DIAGNOSIS — Z78.9 DECREASED ACTIVITIES OF DAILY LIVING (ADL): ICD-10-CM

## 2025-06-02 DIAGNOSIS — M25.60 DECREASED RANGE OF MOTION: Primary | ICD-10-CM

## 2025-06-02 DIAGNOSIS — S56.429A EXTENSOR TENDON LACERATION OF FINGER WITH OPEN WOUND, INITIAL ENCOUNTER: Primary | ICD-10-CM

## 2025-06-02 DIAGNOSIS — S61.209A EXTENSOR TENDON LACERATION OF FINGER WITH OPEN WOUND, INITIAL ENCOUNTER: Primary | ICD-10-CM

## 2025-06-02 DIAGNOSIS — R29.898 WEAKNESS OF RIGHT HAND: ICD-10-CM

## 2025-06-02 PROCEDURE — 97110 THERAPEUTIC EXERCISES: CPT

## 2025-06-02 PROCEDURE — 99024 POSTOP FOLLOW-UP VISIT: CPT | Performed by: ORTHOPAEDIC SURGERY

## 2025-06-02 RX ORDER — CEPHALEXIN 500 MG/1
500 CAPSULE ORAL 4 TIMES DAILY
Qty: 28 CAPSULE | Refills: 0 | Status: SHIPPED | OUTPATIENT
Start: 2025-06-02 | End: 2025-06-09

## 2025-06-02 NOTE — PROGRESS NOTES
[facility] ORTHOPAEDICS  [address] [place] [id number]  Dept: [phone]      Occupational Therapy Daily Note     Referring MD: Marissa Brown MD    Diagnosis:     ICD-10-CM    1. Decreased range of motion  M25.60       2. Decreased activities of daily living (ADL)  Z78.9       3. Weakness of right hand  R29.898              Surgery: Date 5/21/25     Therapy precautions: Tendon precautions and Fracture precautions    History of injury/onset : The patient is a 64 year old male s/p injury to right hand with table saw resulting in right index finger traumatic arthrotomy of metacarpophalangeal joint, open fracture right second metacarpal head, extensor tendon laceration in zone 5.  He underwent repair on 5/21/25.     Payor: Payor: [employer] /  /  /  Billing pattern: Government- total time   Total Direct Treatment Time: 30 min                       Total In Office Time: 30 min  Modifier needed: No  Episode visit count:  3     Preferred Name:  Walker    PERTINENT MEDICAL HISTORY     PMHX & Meds:   Past Medical History:   Diagnosis Date    Blurry vision, bilateral     pt denies currently (2/15/18)    Prediabetes     diet controlled   ,   Past Surgical History:   Procedure Laterality Date    COLONOSCOPY N/A 2/20/2018    [place]COPY/ 27 [place] [facility] [place]    FINGER SURGERY Right 2016    HAND TENDON SURGERY Right 5/21/2025    Right index finger extensor tendon repair, metocarpophalangeal arthrotomy, debridement performed by Marissa Brown MD at [facility] OPC    KNEE ARTHROSCOPY Left 1986      Medications. : Reviewed in chart  Allergies: No Known Allergies     SUBJECTIVE     Current Symptoms/Chief complaints:   Chief Complaint   Patient presents with    Finger Injury     OBJECTIVE     Functional Outcome Measures: Quick Dash  31 score=   45.45 % functional deficit  Hand/Side Dominance: right handed  Digital AROM:  IE IF LF RF SF   AROM    MCP       AROM      PIP

## 2025-06-03 NOTE — PROGRESS NOTES
Orthopaedic Hand Surgery Note    Name: Gabriel Wayne  YOB: 1960  Gender: male  MRN: [id number]    Post Operative Visit: Right index finger extensor tendon repair, metocarpophalangeal arthrotomy, debridement - Right    HPI: Patient is status post Right index finger extensor tendon repair, metocarpophalangeal arthrotomy, debridement - Right on 5/21/2025. Patient reports well controlled pain.    Physical Examination:  Surgical incision is clean, dry and intact.  Sutures are in place.  There is no erythema. There was scant purulent drainage at the site of the most ulnar suture, near the second webspace, no palpable collection or additional drainage able to be expressed, minimal tenderness over the dorsal 2nd MCP. He is able to perform active index MCP extension. . Sensation is intact in all fingers. Motor exam reveals no deficits. .    Imaging:     none    Assessment:   1. Extensor tendon laceration of finger with open wound, initial encounter         Status post Right index finger extensor tendon repair, metocarpophalangeal arthrotomy, debridement - Right on 5/21/2025    Plan:  We discussed the post operative course and progression. Sutures were removed and steri strips applied. He can perform soap and water washes but no soaking the wound for another 2 weeks. Will provide a prescription for keflex as a precaution. He will continue hand therapy for a zone 5 extensor tendon protocol. He is to avoid any lifting with the right hand. He will contact us immediately if he experiences redness, worsening pain, drainage. He will follow up in 4 weeks        Marissa Brown MD  06/03/25  4:40 PM

## 2025-06-05 ENCOUNTER — TREATMENT (OUTPATIENT)
Age: 65
End: 2025-06-05
Payer: MEDICARE

## 2025-06-05 DIAGNOSIS — R29.898 WEAKNESS OF RIGHT HAND: ICD-10-CM

## 2025-06-05 DIAGNOSIS — Z78.9 DECREASED ACTIVITIES OF DAILY LIVING (ADL): ICD-10-CM

## 2025-06-05 DIAGNOSIS — M25.60 DECREASED RANGE OF MOTION: Primary | ICD-10-CM

## 2025-06-05 PROCEDURE — 97010 HOT OR COLD PACKS THERAPY: CPT

## 2025-06-05 PROCEDURE — 97110 THERAPEUTIC EXERCISES: CPT

## 2025-06-05 NOTE — PROGRESS NOTES
[facility] ORTHOPAEDICS  [address] [place] [id number]  Dept: [phone]      Occupational Therapy Daily Note     Referring MD: Marissa Brown MD    Diagnosis:     ICD-10-CM    1. Decreased range of motion  M25.60       2. Decreased activities of daily living (ADL)  Z78.9       3. Weakness of right hand  R29.898              Surgery: Date 5/21/25     Therapy precautions: Tendon precautions and Fracture precautions    History of injury/onset : The patient is a 64 year old male s/p injury to right hand with table saw resulting in right index finger traumatic arthrotomy of metacarpophalangeal joint, open fracture right second metacarpal head, extensor tendon laceration in zone 5.  He underwent repair on 5/21/25.     Payor: Payor: [employer] /  /  /  Billing pattern: Government- total time   Total Direct Treatment Time: 30 min                       Total In Office Time: 40 min  Modifier needed: No  Episode visit count:  4     Preferred Name:  Walker    PERTINENT MEDICAL HISTORY     PMHX & Meds:   Past Medical History:   Diagnosis Date    Blurry vision, bilateral     pt denies currently (2/15/18)    Prediabetes     diet controlled   ,   Past Surgical History:   Procedure Laterality Date    COLONOSCOPY N/A 2/20/2018    COLONOSCOPY/ 27 performed by Noah Smith DO at [facility] [place]OSCOPY    FINGER SURGERY Right 2016    HAND TENDON SURGERY Right 5/21/2025    Right index finger extensor tendon repair, metocarpophalangeal arthrotomy, debridement performed by Marissa Brown MD at [facility] OPC    KNEE ARTHROSCOPY Left 1986      Medications. : Reviewed in chart  Allergies: No Known Allergies     SUBJECTIVE     Current Symptoms/Chief complaints:   Chief Complaint   Patient presents with    Finger Injury     OBJECTIVE     Functional Outcome Measures: Quick Dash  31 score=   45.45 % functional deficit  Hand/Side Dominance: right handed  Digital AROM:  IE IF LF RF SF   AROM    MCP       AROM      PIP

## 2025-06-11 ENCOUNTER — TREATMENT (OUTPATIENT)
Age: 65
End: 2025-06-11
Payer: MEDICARE

## 2025-06-11 DIAGNOSIS — R29.898 WEAKNESS OF RIGHT HAND: ICD-10-CM

## 2025-06-11 DIAGNOSIS — M25.60 DECREASED RANGE OF MOTION: Primary | ICD-10-CM

## 2025-06-11 DIAGNOSIS — Z78.9 DECREASED ACTIVITIES OF DAILY LIVING (ADL): ICD-10-CM

## 2025-06-11 PROCEDURE — 97110 THERAPEUTIC EXERCISES: CPT

## 2025-06-11 PROCEDURE — 97010 HOT OR COLD PACKS THERAPY: CPT

## 2025-06-11 NOTE — PROGRESS NOTES
as helping to decrease pain/spasms and swelling  97530-Therapeutic activities using dynamic activities to improve function  12400 Initial orthotic management and training: Fabrication/adjustments as indicated  11653-Bihyymlmtc orthotic management utilizing splint repairs and adjustments addressing fit, comfort and positioning  Modalities prn to address pain, spasms, tissue extensibility and swelling:(31051) Electrical stimulation - attended  (28515/) Electrical stimulation- unattended  (21443) Ultrasound/phonophoresis  (50488) Hot/cold pack  (30795) Fluidotherapy  (37791) Paraffin  81235- OT Re-evaluation    The referring medical provider has reviewed and approved this evaluation and plan of care as noted by the electronic signature attached to note.    GOALS     Short term goals: 6/9/2025 (2 weeks)   Patient will be I with HEP and precautions. (MET)  Increase AROM of affected IF MP flexion to at least 50 ° to improve ability to grasp. (MET)  Increase AROM of affected IF MP extension to full extension to improve ability to open hand to obtain objects. (MET)  Increase AROM of affected IF PIP flexion to at least 75 ° to improve ability to grasp.  Increase AROM of affected IF PIP extension to full  to improve ability to open hand for ADL's.  Increase AROM of affected IF DIP flexion to at least 45 ° to improve ability to grasp.  Improve Quick DASH functional assessment score from less than 30% deficit.    Long term goals: 7/7/2025  (6 weeks)   Pt will be able to use the affected UE for all ADL's without difficulty.  Pt will have adequate strength to be able to hold and open containers without difficulty.  Pt will be able to make a full composite fist with the involved hand to enable to grasp and hold objects.  Pt will have full active composite extension of affected side to be able to open hand to acquire items for ADL's.  Pt will lack 10 °  or less of PIP extension involved digit.  Minimal edema in involved digit.

## 2025-06-18 ENCOUNTER — TREATMENT (OUTPATIENT)
Age: 65
End: 2025-06-18
Payer: MEDICARE

## 2025-06-18 DIAGNOSIS — M25.60 DECREASED RANGE OF MOTION: Primary | ICD-10-CM

## 2025-06-18 DIAGNOSIS — Z78.9 DECREASED ACTIVITIES OF DAILY LIVING (ADL): ICD-10-CM

## 2025-06-18 DIAGNOSIS — R29.898 WEAKNESS OF RIGHT HAND: ICD-10-CM

## 2025-06-18 PROCEDURE — 97010 HOT OR COLD PACKS THERAPY: CPT

## 2025-06-18 PROCEDURE — 97110 THERAPEUTIC EXERCISES: CPT

## 2025-06-18 NOTE — PROGRESS NOTES
continue RMO day, night and with light functional activity. He is progressing towards meeting all goals.     PLAN OF CARE     NEXT VISIT 6/25/25: no changes in treatment; continue current precautions; No PROM or strengthening, continue current splint wearing schedule as outlined above, consider thermal ultrasound over dorsal Index Finger for scar management    Effective Dates: 5/27/2025 TO 8/24/2025 (90 days).    Frequency/Duration: 2x/week for 90 Day(s)  Interventions may include but are not limited to:   97110-Therapeutic procedure/exercise to develop ROM, strength, endurance and flexibility.  (04950- Manual therapy techniques to improve joint and/or soft tissue mobility, ROM, and function as well as helping to decrease pain/spasms and swelling  97530-Therapeutic activities using dynamic activities to improve function  62837 Initial orthotic management and training: Fabrication/adjustments as indicated  85200-Ccwvlxlbar orthotic management utilizing splint repairs and adjustments addressing fit, comfort and positioning  Modalities prn to address pain, spasms, tissue extensibility and swelling:(13781) Electrical stimulation - attended  (41592/) Electrical stimulation- unattended  (56493) Ultrasound/phonophoresis  (38259) Hot/cold pack  (18209) Fluidotherapy  (00469) Paraffin  39038- OT Re-evaluation    The referring medical provider has reviewed and approved this evaluation and plan of care as noted by the electronic signature attached to note.    GOALS     Short term goals: 6/9/2025 (2 weeks)   Patient will be I with HEP and precautions. (MET)  Increase AROM of affected IF MP flexion to at least 50 ° to improve ability to grasp. (MET)  Increase AROM of affected IF MP extension to full extension to improve ability to open hand to obtain objects. (MET)  Increase AROM of affected IF PIP flexion to at least 75 ° to improve ability to grasp.  Increase AROM of affected IF PIP extension to full  to improve ability

## 2025-06-25 ENCOUNTER — TREATMENT (OUTPATIENT)
Age: 65
End: 2025-06-25
Payer: MEDICARE

## 2025-06-25 DIAGNOSIS — Z78.9 DECREASED ACTIVITIES OF DAILY LIVING (ADL): ICD-10-CM

## 2025-06-25 DIAGNOSIS — R29.898 WEAKNESS OF RIGHT HAND: ICD-10-CM

## 2025-06-25 DIAGNOSIS — M25.60 DECREASED RANGE OF MOTION: Primary | ICD-10-CM

## 2025-06-25 PROCEDURE — 97022 WHIRLPOOL THERAPY: CPT

## 2025-06-25 PROCEDURE — 97110 THERAPEUTIC EXERCISES: CPT

## 2025-06-25 NOTE — PROGRESS NOTES
[facility]  [address] [place] [id number]  Dept: [phone]      Occupational Therapy Daily Note     Referring MD: Marissa Brown MD    Diagnosis:     ICD-10-CM    1. Decreased range of motion  M25.60       2. Decreased activities of daily living (ADL)  Z78.9       3. Weakness of right hand  R29.898                Surgery: Date 5/21/25     Therapy precautions: Tendon precautions and Fracture precautions    History of injury/onset : The patient is a 64 year old male s/p injury to right hand with table saw resulting in right index finger traumatic arthrotomy of metacarpophalangeal joint, open fracture right second metacarpal head, extensor tendon laceration in zone 5.  He underwent repair on 5/21/25.     Payor: Payor: [employer] /  /  /  Billing pattern: Government- total time   Total Direct Treatment Time: 30 min                       Total In Office Time: 40 min  Modifier needed: No  Episode visit count:  7     Preferred Name:  Walker    PERTINENT MEDICAL HISTORY     PMHX & Meds:   Past Medical History:   Diagnosis Date    Blurry vision, bilateral     pt denies currently (2/15/18)    Prediabetes     diet controlled   ,   Past Surgical History:   Procedure Laterality Date    COLONOSCOPY N/A 2/20/2018    COLONOSCOPY/ 27 performed [place] Noah Smith [place] [facility] [place]    FINGER SURGERY Right 2016    HAND TENDON SURGERY Right 5/21/2025    Right index finger extensor tendon repair, metocarpophalangeal arthrotomy, debridement performed by Marissa Brown MD at [facility]    KNEE ARTHROSCOPY Left 1986      Medications. : Reviewed in chart  Allergies: No Known Allergies     SUBJECTIVE     Current Symptoms/Chief complaints:   Chief Complaint   Patient presents with    Hand Injury     OBJECTIVE     Functional Outcome Measures: 6/18/25 Quick Dash  32 score=   47.73 % functional deficit  Hand/Side Dominance: right handed    Digital AROM:  IE IF LF RF SF   AROM    MCP     
[facility]  [address] [place] [id number]  Dept: [phone]      Occupational Therapy Progress Report     Referring MD: Marissa Brown MD    Diagnosis:   No diagnosis found.       Surgery: Date 5/21/25     Therapy precautions: Tendon precautions and Fracture precautions    History of injury/onset : The patient is a 64 year old male s/p injury to right hand with table saw resulting in right index finger traumatic arthrotomy of metacarpophalangeal joint, open fracture right second metacarpal head, extensor tendon laceration in zone 5.  He underwent repair on 5/21/25.     Payor: Payor: [employer] /  /  /  Billing pattern: Government- total time   Total Direct Treatment Time: 30 min                       Total In Office Time: 40 min  Modifier needed: No  Episode visit count:  Visit count could not be calculated. Make sure you are using a visit which is associated with an episode.     Preferred Name:  Walker    PERTINENT MEDICAL HISTORY     PMHX & Meds:   Past Medical History:   Diagnosis Date    Blurry vision, bilateral     pt denies currently (2/15/18)    Prediabetes     diet controlled   ,   Past Surgical History:   Procedure Laterality Date    COLONOSCOPY N/A 2/20/2018    [place]OSCOPY/ 27 [place] [facility] [place]    FINGER SURGERY Right 2016    HAND TENDON SURGERY Right 5/21/2025    Right index finger extensor tendon repair, metocarpophalangeal arthrotomy, debridement performed by Marissa Brown MD at [facility]    KNEE ARTHROSCOPY Left 1986      Medications. : Reviewed in chart  Allergies: No Known Allergies     SUBJECTIVE     Current Symptoms/Chief complaints:   No chief complaint on file.    OBJECTIVE     Functional Outcome Measures: 6/18/25 Quick Dash  32 score=   47.73 % functional deficit  Hand/Side Dominance: right handed    Digital AROM:  IE IF LF RF SF   AROM    MCP       AROM      PIP       AROM      DIP       Active flexion to DPC 6 
to open hand for ADL's.  Increase AROM of affected IF DIP flexion to at least 45 ° to improve ability to grasp. (MET)  Improve Quick DASH functional assessment score from less than 30% deficit.    Long term goals: 7/7/2025  (6 weeks)   Pt will be able to use the affected UE for all ADL's without difficulty.  Pt will have adequate strength to be able to hold and open containers without difficulty.  Pt will be able to make a full composite fist with the involved hand to enable to grasp and hold objects.  Pt will have full active composite extension of affected side to be able to open hand to acquire items for ADL's.  Pt will lack 10 °  or less of PIP extension involved digit.  Minimal edema in involved digit.   Improve Quick DASH functional assessment score from less than 20% deficit.  Access Code: [email or web address]  URL: [email or web address]  Date: 05/26/2025  Prepared by: Ashley Mims    Exercises  - Finger MP Flexion AROM  - 5 x daily - 7 x weekly - 1 sets - 15 reps  - Seated Claw Fist AROM  - 5 x daily - 7 x weekly - 1 sets - 15 reps  - Seated Full Fist AROM  - 5 x daily - 7 x weekly - 1 sets - 15 reps

## 2025-07-01 ENCOUNTER — OFFICE VISIT (OUTPATIENT)
Age: 65
End: 2025-07-01

## 2025-07-01 ENCOUNTER — TREATMENT (OUTPATIENT)
Age: 65
End: 2025-07-01
Payer: MEDICARE

## 2025-07-01 DIAGNOSIS — M25.60 DECREASED RANGE OF MOTION: Primary | ICD-10-CM

## 2025-07-01 DIAGNOSIS — S56.429A EXTENSOR TENDON LACERATION OF FINGER WITH OPEN WOUND, INITIAL ENCOUNTER: Primary | ICD-10-CM

## 2025-07-01 DIAGNOSIS — S61.209A EXTENSOR TENDON LACERATION OF FINGER WITH OPEN WOUND, INITIAL ENCOUNTER: Primary | ICD-10-CM

## 2025-07-01 DIAGNOSIS — Z78.9 DECREASED ACTIVITIES OF DAILY LIVING (ADL): ICD-10-CM

## 2025-07-01 DIAGNOSIS — R29.898 WEAKNESS OF RIGHT HAND: ICD-10-CM

## 2025-07-01 PROCEDURE — 97110 THERAPEUTIC EXERCISES: CPT

## 2025-07-01 PROCEDURE — 99024 POSTOP FOLLOW-UP VISIT: CPT | Performed by: ORTHOPAEDIC SURGERY

## 2025-07-01 PROCEDURE — 97010 HOT OR COLD PACKS THERAPY: CPT

## 2025-07-01 PROCEDURE — M5045 MISC THERA-PUTTY: HCPCS

## 2025-07-01 NOTE — PROGRESS NOTES
[facility]  [address] [place] [id number]  Dept: [phone]      Occupational Therapy Discharge     Referring MD: Marissa Brown MD    Diagnosis:     ICD-10-CM    1. Decreased range of motion  M25.60       2. Decreased activities of daily living (ADL)  Z78.9       3. Weakness of right hand  R29.898                  Surgery: Date 5/21/25     Therapy precautions: Tendon precautions and Fracture precautions    History of injury/onset : The patient is a 64 year old male s/p injury to right hand with table saw resulting in right index finger traumatic arthrotomy of metacarpophalangeal joint, open fracture right second metacarpal head, extensor tendon laceration in zone 5.  He underwent repair on 5/21/25.     Payor: Payor: [employer] /  /  /  Billing pattern: Government- total time   Total Direct Treatment Time: 25 min                       Total In Office Time: 35 min  Modifier needed: No  Episode visit count:  8     Preferred Name:  Walker    PERTINENT MEDICAL HISTORY     PMHX & Meds:   Past Medical History:   Diagnosis Date    Blurry vision, bilateral     pt denies currently (2/15/18)    Prediabetes     diet controlled   ,   Past Surgical History:   Procedure Laterality Date    COLONOSCOPY N/A 2/20/2018    COLONOSCOPY/ 27 performed by Noah Smith DO at [facility]    FINGER SURGERY Right 2016    HAND TENDON SURGERY Right 5/21/2025    Right index finger extensor tendon repair, metocarpophalangeal arthrotomy, debridement performed by Marissa Brown MD at [facility]    KNEE ARTHROSCOPY Left 1986      Medications. : Reviewed in chart  Allergies: No Known Allergies     SUBJECTIVE     Current Symptoms/Chief complaints:   Chief Complaint   Patient presents with    Finger Injury     OBJECTIVE     Functional Outcome Measures: 6/18/25 Quick Dash  32 score=   47.73 % functional deficit  Hand/Side Dominance: right handed    Digital AROM:  IE IF LF RF SF   AROM    MCP

## 2025-07-01 NOTE — PROGRESS NOTES
Orthopaedic Hand Surgery Note    Name: Gabriel Wayne  YOB: 1960  Gender: male  MRN: [id number]    Post Operative Visit: Right index finger extensor tendon repair, metocarpophalangeal arthrotomy, debridement - Right    HPI: Patient is status post Right index finger extensor tendon repair, metocarpophalangeal arthrotomy, debridement - Right on 5/21/2025. Patient reports well controlled pain.    Physical Examination:  Surgical incision is well-healed, there is no tenderness to palpation.  He is able to perform independent active extension of the index MCP joint. good flexion of the MCP and PIP joints, near baseline per patient    Imaging:     none    Assessment:   1. Extensor tendon laceration of finger with open wound, initial encounter         Status post Right index finger extensor tendon repair, metocarpophalangeal arthrotomy, debridement - Right on 5/21/2025    Plan:  We discussed the post operative course and progression.  He can discontinue use of his relative motion splint.  He can continue range of motion and begin strengthening at this time.  He return to full activities at 8 weeks postop.  He will follow-up as needed        Marissa Brown MD  07/01/25  12:36 PM

## (undated) DEVICE — DISPOSABLE BIPOLAR CODE, 12' (3.66 M): Brand: CONMED

## (undated) DEVICE — GLOVE SURG SZ 65 L12IN FNGR THK79MIL GRN LTX FREE

## (undated) DEVICE — SPLINT THMB W3XL12IN FBRGLS PD PRECUT LTWT DURABLE FAST SET

## (undated) DEVICE — SUTURE ETHILON SZ 3-0 L18IN NONABSORBABLE BLK PS-2 L19MM 3/8 1669H

## (undated) DEVICE — CONNECTOR TBNG OD5-7MM O2 END DISP

## (undated) DEVICE — SOLUTION IRRIG 1000ML 09% SOD CHL USP PIC PLAS CONTAINER

## (undated) DEVICE — RUBBERBAND FASTENING W0.25XL3.5IN 5 PER PK

## (undated) DEVICE — SUTURE VICRYL SZ 4-0 L27IN ABSRB UD L26MM SH 1/2 CIR J415H

## (undated) DEVICE — TUBING, SUCTION, 1/4" X 10', STRAIGHT: Brand: MEDLINE

## (undated) DEVICE — AIRLIFE™ OXYGEN TUBING 7 FEET (2.1 M) CRUSH RESISTANT OXYGEN TUBING, VINYL TIPPED: Brand: AIRLIFE™

## (undated) DEVICE — SYRINGE IRRIG 60ML SFT PLIABLE BLB EZ TO GRP 1 HND USE W/

## (undated) DEVICE — SYRINGE MED 10ML LUERLOCK TIP W/O SFTY DISP

## (undated) DEVICE — SUTURE NONABSORBABLE MONOFILAMENT 4-0 PS-2 18 IN BLU PROLENE 8682H

## (undated) DEVICE — HAND PACK: Brand: MEDLINE INDUSTRIES, INC.

## (undated) DEVICE — ZIMMER® STERILE DISPOSABLE TOURNIQUET CUFF WITH PLC, DUAL PORT, SINGLE BLADDER, 18 IN. (46 CM)

## (undated) DEVICE — DRESSING,GAUZE,XEROFORM,CURAD,1"X8",ST: Brand: CURAD

## (undated) DEVICE — KENDALL RADIOLUCENT FOAM MONITORING ELECTRODE RECTANGULAR SHAPE: Brand: KENDALL

## (undated) DEVICE — GLOVE SURG SZ 65 THK91MIL LTX FREE SYN POLYISOPRENE

## (undated) DEVICE — PADDING CAST W3INXL4YD COT BLEND MIC PLEAT UNDERCAST SPEC

## (undated) DEVICE — BNDG,ELSTC,MATRIX,STRL,3"X5YD,LF,HOOK&LP: Brand: MEDLINE

## (undated) DEVICE — CANNULA NSL ORAL AD FOR CAPNOFLEX CO2 O2 AIRLFE

## (undated) DEVICE — SOLUTION IRRIG 1000ML H2O PIC PLAS SHATTERPROOF CONTAINER

## (undated) DEVICE — DRAPE,U/SHT,SPLIT,FILM,60X84,STERILE: Brand: MEDLINE